# Patient Record
Sex: FEMALE | Race: WHITE | Employment: OTHER | URBAN - METROPOLITAN AREA
[De-identification: names, ages, dates, MRNs, and addresses within clinical notes are randomized per-mention and may not be internally consistent; named-entity substitution may affect disease eponyms.]

---

## 2017-01-10 DIAGNOSIS — E03.9 HYPOTHYROIDISM, UNSPECIFIED TYPE: ICD-10-CM

## 2017-01-10 RX ORDER — LEVOTHYROXINE SODIUM 75 MCG
TABLET ORAL
Qty: 30 TABLET | Refills: 9 | OUTPATIENT
Start: 2017-01-10 | End: 2017-01-13 | Stop reason: SDUPTHER

## 2017-01-13 DIAGNOSIS — E03.9 HYPOTHYROIDISM, UNSPECIFIED TYPE: ICD-10-CM

## 2017-01-13 RX ORDER — LEVOTHYROXINE SODIUM 75 MCG
TABLET ORAL
Qty: 30 TABLET | Refills: 8 | Status: SHIPPED | OUTPATIENT
Start: 2017-01-13 | End: 2017-11-04 | Stop reason: SDUPTHER

## 2017-01-23 ENCOUNTER — TELEPHONE (OUTPATIENT)
Dept: FAMILY MEDICINE CLINIC | Age: 72
End: 2017-01-23

## 2017-07-28 ENCOUNTER — HOSPITAL ENCOUNTER (OUTPATIENT)
Dept: MAMMOGRAPHY | Age: 72
Discharge: HOME | End: 2017-07-28
Attending: FAMILY MEDICINE | Admitting: FAMILY MEDICINE

## 2017-07-28 DIAGNOSIS — Z12.31 VISIT FOR SCREENING MAMMOGRAM: ICD-10-CM

## 2017-07-31 ENCOUNTER — TELEPHONE (OUTPATIENT)
Dept: OTHER | Facility: CLINIC | Age: 72
End: 2017-07-31

## 2017-08-28 ENCOUNTER — OFFICE VISIT (OUTPATIENT)
Dept: DERMATOLOGY | Age: 72
End: 2017-08-28

## 2017-08-28 DIAGNOSIS — L57.0 AK (ACTINIC KERATOSIS): ICD-10-CM

## 2017-08-28 DIAGNOSIS — D23.9 SYRINGOMA: ICD-10-CM

## 2017-08-28 DIAGNOSIS — L81.4 LENTIGINES: ICD-10-CM

## 2017-08-28 DIAGNOSIS — D22.9 MULTIPLE NEVI: Primary | ICD-10-CM

## 2017-08-28 DIAGNOSIS — L82.1 SEBORRHEIC KERATOSIS: ICD-10-CM

## 2017-08-28 PROCEDURE — G8421 BMI NOT CALCULATED: HCPCS | Performed by: DERMATOLOGY

## 2017-08-28 PROCEDURE — G8427 DOCREV CUR MEDS BY ELIG CLIN: HCPCS | Performed by: DERMATOLOGY

## 2017-08-28 PROCEDURE — 3014F SCREEN MAMMO DOC REV: CPT | Performed by: DERMATOLOGY

## 2017-08-28 PROCEDURE — G8599 NO ASA/ANTIPLAT THER USE RNG: HCPCS | Performed by: DERMATOLOGY

## 2017-08-28 PROCEDURE — G8399 PT W/DXA RESULTS DOCUMENT: HCPCS | Performed by: DERMATOLOGY

## 2017-08-28 PROCEDURE — 17000 DESTRUCT PREMALG LESION: CPT | Performed by: DERMATOLOGY

## 2017-08-28 PROCEDURE — 99213 OFFICE O/P EST LOW 20 MIN: CPT | Performed by: DERMATOLOGY

## 2017-08-28 PROCEDURE — 4040F PNEUMOC VAC/ADMIN/RCVD: CPT | Performed by: DERMATOLOGY

## 2017-08-28 PROCEDURE — 1036F TOBACCO NON-USER: CPT | Performed by: DERMATOLOGY

## 2017-08-28 PROCEDURE — 1090F PRES/ABSN URINE INCON ASSESS: CPT | Performed by: DERMATOLOGY

## 2017-08-28 PROCEDURE — 3017F COLORECTAL CA SCREEN DOC REV: CPT | Performed by: DERMATOLOGY

## 2017-08-28 PROCEDURE — 1123F ACP DISCUSS/DSCN MKR DOCD: CPT | Performed by: DERMATOLOGY

## 2017-09-06 ENCOUNTER — TELEPHONE (OUTPATIENT)
Dept: SURGERY | Age: 72
End: 2017-09-06

## 2017-10-05 ENCOUNTER — TELEPHONE (OUTPATIENT)
Dept: FAMILY MEDICINE CLINIC | Age: 72
End: 2017-10-05

## 2017-10-17 ENCOUNTER — OFFICE VISIT (OUTPATIENT)
Dept: FAMILY MEDICINE CLINIC | Age: 72
End: 2017-10-17

## 2017-10-17 VITALS
BODY MASS INDEX: 22.51 KG/M2 | TEMPERATURE: 97.7 F | HEART RATE: 52 BPM | RESPIRATION RATE: 12 BRPM | DIASTOLIC BLOOD PRESSURE: 72 MMHG | HEIGHT: 61 IN | SYSTOLIC BLOOD PRESSURE: 132 MMHG | WEIGHT: 119.2 LBS

## 2017-10-17 DIAGNOSIS — M54.50 ACUTE LEFT-SIDED LOW BACK PAIN WITHOUT SCIATICA: ICD-10-CM

## 2017-10-17 DIAGNOSIS — M76.32 ILIOTIBIAL BAND SYNDROME OF LEFT SIDE: Primary | ICD-10-CM

## 2017-10-17 PROCEDURE — G8484 FLU IMMUNIZE NO ADMIN: HCPCS | Performed by: FAMILY MEDICINE

## 2017-10-17 PROCEDURE — 4040F PNEUMOC VAC/ADMIN/RCVD: CPT | Performed by: FAMILY MEDICINE

## 2017-10-17 PROCEDURE — 1036F TOBACCO NON-USER: CPT | Performed by: FAMILY MEDICINE

## 2017-10-17 PROCEDURE — G8427 DOCREV CUR MEDS BY ELIG CLIN: HCPCS | Performed by: FAMILY MEDICINE

## 2017-10-17 PROCEDURE — 99214 OFFICE O/P EST MOD 30 MIN: CPT | Performed by: FAMILY MEDICINE

## 2017-10-17 PROCEDURE — 3288F FALL RISK ASSESSMENT DOCD: CPT | Performed by: FAMILY MEDICINE

## 2017-10-17 PROCEDURE — 1123F ACP DISCUSS/DSCN MKR DOCD: CPT | Performed by: FAMILY MEDICINE

## 2017-10-17 PROCEDURE — G8510 SCR DEP NEG, NO PLAN REQD: HCPCS | Performed by: FAMILY MEDICINE

## 2017-10-17 PROCEDURE — 3014F SCREEN MAMMO DOC REV: CPT | Performed by: FAMILY MEDICINE

## 2017-10-17 PROCEDURE — G8399 PT W/DXA RESULTS DOCUMENT: HCPCS | Performed by: FAMILY MEDICINE

## 2017-10-17 PROCEDURE — G8599 NO ASA/ANTIPLAT THER USE RNG: HCPCS | Performed by: FAMILY MEDICINE

## 2017-10-17 PROCEDURE — G8420 CALC BMI NORM PARAMETERS: HCPCS | Performed by: FAMILY MEDICINE

## 2017-10-17 PROCEDURE — 1090F PRES/ABSN URINE INCON ASSESS: CPT | Performed by: FAMILY MEDICINE

## 2017-10-17 PROCEDURE — 3017F COLORECTAL CA SCREEN DOC REV: CPT | Performed by: FAMILY MEDICINE

## 2017-10-17 RX ORDER — PREDNISONE 20 MG/1
TABLET ORAL
Qty: 12 TABLET | Refills: 0 | Status: SHIPPED | OUTPATIENT
Start: 2017-10-17 | End: 2017-10-27

## 2017-10-17 ASSESSMENT — PATIENT HEALTH QUESTIONNAIRE - PHQ9
SUM OF ALL RESPONSES TO PHQ QUESTIONS 1-9: 0
2. FEELING DOWN, DEPRESSED OR HOPELESS: 0
SUM OF ALL RESPONSES TO PHQ9 QUESTIONS 1 & 2: 0
1. LITTLE INTEREST OR PLEASURE IN DOING THINGS: 0

## 2017-10-17 NOTE — PATIENT INSTRUCTIONS
hands.  3. Straighten your knee, and slowly pull back on the towel. You should feel a gentle stretch down the back of your leg. 4. Hold the stretch for 15 to 30 seconds. Or even better, hold the stretch for 1 minute if you can. 5. Repeat 2 to 4 times. Follow-up care is a key part of your treatment and safety. Be sure to make and go to all appointments, and call your doctor if you are having problems. It's also a good idea to know your test results and keep a list of the medicines you take. Where can you learn more? Go to [email or web address] and sign in to your [employer] account. Enter P252 in the SunLink box to learn more about \"Iliotibial Band Syndrome: Exercises. \"     If you do not have an account, please click on the \"Sign Up Now\" link. Current as of: March 21, 2017  Content Version: 11.3  © 3769-4931 [employer]. Care instructions adapted under license by [facility] . If you have questions about a medical condition or this instruction, always ask your healthcare professional. [place] [address] any warranty or liability for your use of this information. Patient Education        Low Back Pain: Exercises  Your Care Instructions  Here are some examples of typical rehabilitation exercises for your condition. Start each exercise slowly. Ease off the exercise if you start to have pain. Your doctor or physical therapist will tell you when you can start these exercises and which ones will work best for you. How to do the exercises  Press-up    6. Lie on your stomach, supporting your body with your forearms. 7. Press your elbows down into the floor to raise your upper back. As you do this, relax your stomach muscles and allow your back to arch without using your back muscles. As your press up, do not let your hips or pelvis come off the floor. 8. Hold for 15 to 30 seconds, then relax. 9. Repeat 2 to 4 times.   Alternate arm and leg (bird dog) exercise    Note: Do this exercise slowly. Try to keep your body straight at all times, and do not let one hip drop lower than the other. 5. Start on the floor, on your hands and knees. 6. Tighten your belly muscles. 7. Raise one leg off the floor, and hold it straight out behind you. Be careful not to let your hip drop down, because that will twist your trunk. 8. Hold for about 6 seconds, then lower your leg and switch to the other leg. 9. Repeat 8 to 12 times on each leg. 10. Over time, work up to holding for 10 to 30 seconds each time. 11. If you feel stable and secure with your leg raised, try raising the opposite arm straight out in front of you at the same time. Knee-to-chest exercise    1. Lie on your back with your knees bent and your feet flat on the floor. 2. Bring one knee to your chest, keeping the other foot flat on the floor (or keeping the other leg straight, whichever feels better on your lower back). 3. Keep your lower back pressed to the floor. Hold for at least 15 to 30 seconds. 4. Relax, and lower the knee to the starting position. 5. Repeat with the other leg. Repeat 2 to 4 times with each leg. 6. To get more stretch, put your other leg flat on the floor while pulling your knee to your chest.  Curl-ups    6. Lie on the floor on your back with your knees bent at a 90-degree angle. Your feet should be flat on the floor, about 12 inches from your buttocks. 7. Cross your arms over your chest. If this bothers your neck, try putting your hands behind your neck (not your head), with your elbows spread apart. 8. Slowly tighten your belly muscles and raise your shoulder blades off the floor. 9. Keep your head in line with your body, and do not press your chin to your chest.  10. Hold this position for 1 or 2 seconds, then slowly lower yourself back down to the floor. 11. Repeat 8 to 12 times. Pelvic tilt exercise    1. Lie on your back with your knees bent. 2.  \"Brace\" your stomach. This means to tighten your muscles by pulling in and imagining your belly button moving toward your spine. You should feel like your back is pressing to the floor and your hips and pelvis are rocking back. 3. Hold for about 6 seconds while you breathe smoothly. 4. Repeat 8 to 12 times. Heel dig bridging    1. Lie on your back with both knees bent and your ankles bent so that only your heels are digging into the floor. Your knees should be bent about 90 degrees. 2. Then push your heels into the floor, squeeze your buttocks, and lift your hips off the floor until your shoulders, hips, and knees are all in a straight line. 3. Hold for about 6 seconds as you continue to breathe normally, and then slowly lower your hips back down to the floor and rest for up to 10 seconds. 4. Do 8 to 12 repetitions. Hamstring stretch in doorway    1. Lie on your back in a doorway, with one leg through the open door. 2. Slide your leg up the wall to straighten your knee. You should feel a gentle stretch down the back of your leg. 3. Hold the stretch for at least 15 to 30 seconds. Do not arch your back, point your toes, or bend either knee. Keep one heel touching the floor and the other heel touching the wall. 4. Repeat with your other leg. 5. Do 2 to 4 times for each leg. Hip flexor stretch    1. Kneel on the floor with one knee bent and one leg behind you. Place your forward knee over your foot. Keep your other knee touching the floor. 2. Slowly push your hips forward until you feel a stretch in the upper thigh of your rear leg. 3. Hold the stretch for at least 15 to 30 seconds. Repeat with your other leg. 4. Do 2 to 4 times on each side. Wall sit    1. Stand with your back 10 to 12 inches away from a wall. 2. Lean into the wall until your back is flat against it. 3. Slowly slide down until your knees are slightly bent, pressing your lower back into the wall.   4. Hold for about 6 seconds, then slide back up the wall.  5. Repeat 8 to 12 times. Follow-up care is a key part of your treatment and safety. Be sure to make and go to all appointments, and call your doctor if you are having problems. It's also a good idea to know your test results and keep a list of the medicines you take. Where can you learn more? Go to [email or web address] and sign in to your [employer] account. Enter U320 in the Nelbee box to learn more about \"Low Back Pain: Exercises. \"     If you do not have an account, please click on the \"Sign Up Now\" link. Current as of: March 21, 2017  Content Version: 11.3  © 5682-6317 [employer]. Care instructions adapted under license by [facility] ([facility]). If you have questions about a medical condition or this instruction, always ask your healthcare professional. [address] any warranty or liability for your use of this information.

## 2017-10-17 NOTE — PROGRESS NOTES
Patient is here for low back pain. [place] [facility] and noticed the pain then a bit X 4 weeks with dog pulling. She did not take anything then - dull ache 0-2/10. No radiation then to legs. She travelled for 3 weeks shortly after the incident with the dog and has had 7-8 plane rides since and carrying luggage. Her pain now is 0-8/10, daily , intermittent. It is worse at night and is now awakening her. Taking Aleve 2 pills bid and helps slightly . No fever. Radiation to left leg to knee - calf - ankle. Hard shara aggravates the pain. Pain is worse at night, but better during the day. Prolonged sitting or hard surface aggravates the pain. 3/10 now. No bowel or bladder incontinence. She is leaving to visit her daughter in [place], [place]. She was in [place] and [facility] and got in last night. Connecting flights in [place] and [facility]. ROS: All other systems were reviewed and are negative . Patient's allergies and medications were reviewed. Patient's past medical, surgical, social , and family history were reviewed. OBJECTIVE:  /72   Pulse 52   Temp 97.7 °F (36.5 °C)   Resp 12   Ht 5' 1.12\" (1.552 m)   Wt 119 lb 3.2 oz (54.1 kg)   BMI 22.43 kg/m²   General: NAD, cooperative, alert and oriented X 3. Mood / affect is good. good insight. well hydrated. Neck : no lymphadenopathy, supple, FROM  CV: Regular rate and rhythm , no murmurs/ rub/ gallop. No edema. Lungs : CTA bilaterally, breathing comfortably  Abdomen: positive bowel sounds, soft , non tender, non distended. No hepatosplenomegaly. No CVA tenderness. Back: pain with hyperextension . No pain with flexion or rotation. No edema or erythema. Lower extremities : DTRs 2+ knees and ankles bilateral.  FROM. Strength 5/5. Negative straight leg-raise. No edema or erythema bilateral.   LLE : tenderness to lateral thigh below hip to knee. No edema or erythema. Skin: no rashes. Non tender. ASSESSMENT/  PLAN:  Courtney Webber was seen today for back pain. Diagnoses and all orders for this visit:    Iliotibial band syndrome of left side  -     PredniSONE (DELTASONE) 20 MG tablet; 3 pills po qd X 2d; 2 pills po qd X 2d; 1 pill po qd X 2d. -     Moist heat . ROM exercises- handout given. Modify activities. Acute left-sided low back pain without sciatica  -     PredniSONE (DELTASONE) 20 MG tablet; 3 pills po qd X 2d; 2 pills po qd X 2d; 1 pill po qd X 2d. -     Moist heat . ROM exercises. Modify activities. Follow up if no improvement in 2- 4 weeks/ as needed for increased symptoms.

## 2017-10-24 ENCOUNTER — PATIENT MESSAGE (OUTPATIENT)
Dept: FAMILY MEDICINE CLINIC | Age: 72
End: 2017-10-24

## 2017-10-24 ENCOUNTER — TELEPHONE (OUTPATIENT)
Dept: FAMILY MEDICINE CLINIC | Age: 72
End: 2017-10-24

## 2017-10-25 ENCOUNTER — TELEPHONE (OUTPATIENT)
Dept: FAMILY MEDICINE CLINIC | Age: 72
End: 2017-10-25

## 2017-10-25 ENCOUNTER — PATIENT MESSAGE (OUTPATIENT)
Dept: FAMILY MEDICINE CLINIC | Age: 72
End: 2017-10-25

## 2017-10-25 DIAGNOSIS — M54.50 ACUTE LEFT-SIDED LOW BACK PAIN WITHOUT SCIATICA: Primary | ICD-10-CM

## 2017-10-25 DIAGNOSIS — M76.32 ILIOTIBIAL BAND SYNDROME OF LEFT SIDE: ICD-10-CM

## 2017-10-25 RX ORDER — BACLOFEN 10 MG/1
10 TABLET ORAL 3 TIMES DAILY
Qty: 20 TABLET | Refills: 0 | Status: SHIPPED | OUTPATIENT
Start: 2017-10-25 | End: 2018-04-16 | Stop reason: CLARIF

## 2017-10-25 NOTE — TELEPHONE ENCOUNTER
Baclofen was sent to the pharmacy for muscle spasms , which is similar to Flexeril. An order/ referral for Physical Therapy is in Epic. Please inform the patient and see where she would like referral to be sent. (There are 2 [employer] messages).

## 2017-10-25 NOTE — TELEPHONE ENCOUNTER
From: Holland Hudson  To: Itzel Chu MD  Sent: 10/24/2017 9:06 PM EDT  Subject: Visit Follow-Up Question    I called the office today for a referral for physical therapy but was told only u could do that. I'm using a roller on the IT band & doing exercises but think therapy could give me more direction.      Thanks  Shyam Montero

## 2017-10-26 NOTE — TELEPHONE ENCOUNTER
Pt would like advise on which physical therapy center to schedule with. Please advise as she states she is in immense pain and needs relief as soon as possible, as she continued to have restlessness last night, due to pain, despite the baclofen. Thanks.

## 2017-10-26 NOTE — TELEPHONE ENCOUNTER
Spoke to pt. Gave # for [facility] and order faxed there. pt. To check with ins.  To make sure [facility] is covered also

## 2017-11-04 DIAGNOSIS — E03.9 HYPOTHYROIDISM, UNSPECIFIED TYPE: ICD-10-CM

## 2017-11-06 ENCOUNTER — PATIENT MESSAGE (OUTPATIENT)
Dept: FAMILY MEDICINE CLINIC | Age: 72
End: 2017-11-06

## 2017-11-06 RX ORDER — LEVOTHYROXINE SODIUM 0.07 MG/1
TABLET ORAL
Qty: 90 TABLET | Refills: 0 | Status: SHIPPED | OUTPATIENT
Start: 2017-11-06 | End: 2018-03-05 | Stop reason: SDUPTHER

## 2017-11-14 ENCOUNTER — PATIENT MESSAGE (OUTPATIENT)
Dept: FAMILY MEDICINE CLINIC | Age: 72
End: 2017-11-14

## 2017-11-14 DIAGNOSIS — M54.50 LOW BACK PAIN WITHOUT SCIATICA, UNSPECIFIED BACK PAIN LATERALITY, UNSPECIFIED CHRONICITY: Primary | ICD-10-CM

## 2017-11-15 ENCOUNTER — PATIENT MESSAGE (OUTPATIENT)
Dept: FAMILY MEDICINE CLINIC | Age: 72
End: 2017-11-15

## 2017-11-15 NOTE — TELEPHONE ENCOUNTER
Prefer the patient be seen. I had prescribed a Prednisone taper at 10/17/17 appointment . If she is not improving she should schedule an appointment. Therefore hold on further medications. Please facilitate scheduling an appointment .

## 2017-11-15 NOTE — TELEPHONE ENCOUNTER
From: Carolyn Toribio  To: Yordy Rascon MD  Sent: 11/15/2017 9:18 AM EST  Subject: Visit Follow-Up Question    I had requested an X-ray for the lower back & leg pain I have been experiencing for approximately 6 weeks. Yesterday the physical therapist suggested that an X-ray wouldnt reveal anything new for me since its muscular. I have a previous prescription of prednisone 20 MG from December 2014 & would like know if u think it is safe to take now. I believe it would help the pain at night if I could reduce the inflammation. Instructions are to take 3 for 3 days, 2 for 3 days, & 1 for 3 days.      Thanks,  Blaire Burnett

## 2017-11-16 ENCOUNTER — HOSPITAL ENCOUNTER (OUTPATIENT)
Dept: OTHER | Age: 72
Discharge: HOME | End: 2017-11-16
Attending: FAMILY MEDICINE | Admitting: FAMILY MEDICINE

## 2017-11-16 ENCOUNTER — OFFICE VISIT (OUTPATIENT)
Dept: FAMILY MEDICINE CLINIC | Age: 72
End: 2017-11-16

## 2017-11-16 VITALS
OXYGEN SATURATION: 100 % | SYSTOLIC BLOOD PRESSURE: 134 MMHG | RESPIRATION RATE: 20 BRPM | DIASTOLIC BLOOD PRESSURE: 79 MMHG | HEART RATE: 74 BPM | TEMPERATURE: 97.8 F | BODY MASS INDEX: 22.58 KG/M2 | WEIGHT: 120 LBS

## 2017-11-16 DIAGNOSIS — M76.32 ILIOTIBIAL BAND SYNDROME, LEFT LEG: ICD-10-CM

## 2017-11-16 DIAGNOSIS — M79.662 PAIN IN LEFT LOWER LEG: ICD-10-CM

## 2017-11-16 DIAGNOSIS — M54.50 LEFT-SIDED LOW BACK PAIN WITHOUT SCIATICA, UNSPECIFIED CHRONICITY: ICD-10-CM

## 2017-11-16 DIAGNOSIS — M54.50 LEFT-SIDED LOW BACK PAIN WITHOUT SCIATICA, UNSPECIFIED CHRONICITY: Primary | ICD-10-CM

## 2017-11-16 PROBLEM — S32.020A COMPRESSION FRACTURE OF L2 (HCC): Status: ACTIVE | Noted: 2017-11-01

## 2017-11-16 PROCEDURE — 1123F ACP DISCUSS/DSCN MKR DOCD: CPT | Performed by: FAMILY MEDICINE

## 2017-11-16 PROCEDURE — 99214 OFFICE O/P EST MOD 30 MIN: CPT | Performed by: FAMILY MEDICINE

## 2017-11-16 PROCEDURE — 1090F PRES/ABSN URINE INCON ASSESS: CPT | Performed by: FAMILY MEDICINE

## 2017-11-16 PROCEDURE — G8484 FLU IMMUNIZE NO ADMIN: HCPCS | Performed by: FAMILY MEDICINE

## 2017-11-16 PROCEDURE — G8427 DOCREV CUR MEDS BY ELIG CLIN: HCPCS | Performed by: FAMILY MEDICINE

## 2017-11-16 PROCEDURE — G8599 NO ASA/ANTIPLAT THER USE RNG: HCPCS | Performed by: FAMILY MEDICINE

## 2017-11-16 PROCEDURE — 3017F COLORECTAL CA SCREEN DOC REV: CPT | Performed by: FAMILY MEDICINE

## 2017-11-16 PROCEDURE — 1036F TOBACCO NON-USER: CPT | Performed by: FAMILY MEDICINE

## 2017-11-16 PROCEDURE — 4040F PNEUMOC VAC/ADMIN/RCVD: CPT | Performed by: FAMILY MEDICINE

## 2017-11-16 PROCEDURE — G8420 CALC BMI NORM PARAMETERS: HCPCS | Performed by: FAMILY MEDICINE

## 2017-11-16 PROCEDURE — 3014F SCREEN MAMMO DOC REV: CPT | Performed by: FAMILY MEDICINE

## 2017-11-16 PROCEDURE — G8399 PT W/DXA RESULTS DOCUMENT: HCPCS | Performed by: FAMILY MEDICINE

## 2017-11-16 RX ORDER — DICLOFENAC SODIUM 75 MG/1
75 TABLET, DELAYED RELEASE ORAL 2 TIMES DAILY
Qty: 60 TABLET | Refills: 3 | Status: SHIPPED | OUTPATIENT
Start: 2017-11-16 | End: 2018-04-16 | Stop reason: CLARIF

## 2017-11-16 RX ORDER — PREDNISONE 20 MG/1
TABLET ORAL
Qty: 18 TABLET | Refills: 0 | Status: SHIPPED | OUTPATIENT
Start: 2017-11-16 | End: 2017-11-25

## 2017-11-16 NOTE — PROGRESS NOTES
tenderness. Back: pain with hyperextension. Some mild pain with rotation. Lower extremities : DTRs 2+ knees and ankles bilateral.  FROM. Strength 5/5. Negative straight leg-raise. No edema or erythema bilateral. Tenderness to left lateral thigh. Tenderness to medial lower leg. Skin: no rashes. Non tender. ASSESSMENT/  PLAN:  Bo Thompson was seen today for back pain. Diagnoses and all orders for this visit:    Left-sided low back pain without sciatica, unspecified chronicity  -     XR LUMBAR SPINE (MIN 4 VIEWS) and follow up after completed/ prn.   -     Diclofenac (VOLTAREN) 75 MG EC tablet; Take 1 tablet by mouth 2 times daily Prn pain to low back and LLE. -     PredniSONE (DELTASONE) 20 MG tablet; Take 3 tabs by mouth x 3d, then 2 tabs x 3d, then 1 tab until gone. -     Moist heat . ROM exercises. Modify activities. Iliotibial band syndrome, left leg  -     Diclofenac (VOLTAREN) 75 MG EC tablet; Take 1 tablet by mouth 2 times daily Prn pain to low back and LLE. -     PredniSONE (DELTASONE) 20 MG tablet; Take 3 tabs by mouth x 3d, then 2 tabs x 3d, then 1 tab until gone. -     Moist heat . ROM exercises. Modify activities. Pain in left lower leg  -     Diclofenac (VOLTAREN) 75 MG EC tablet; Take 1 tablet by mouth 2 times daily Prn pain to low back and LLE. -     PredniSONE (DELTASONE) 20 MG tablet; Take 3 tabs by mouth x 3d, then 2 tabs x 3d, then 1 tab until gone. -     Moist heat . ROM exercises. Modify activities. Follow up 4 -6 weeks/ prn.

## 2018-03-05 DIAGNOSIS — E03.9 HYPOTHYROIDISM, UNSPECIFIED TYPE: ICD-10-CM

## 2018-03-05 RX ORDER — LEVOTHYROXINE SODIUM 0.07 MG/1
TABLET ORAL
Qty: 90 TABLET | Refills: 0 | Status: CANCELLED | OUTPATIENT
Start: 2018-03-05

## 2018-03-05 RX ORDER — LEVOTHYROXINE SODIUM 0.07 MG/1
TABLET ORAL
Qty: 90 TABLET | Refills: 0 | Status: SHIPPED | OUTPATIENT
Start: 2018-03-05 | End: 2018-06-04 | Stop reason: SDUPTHER

## 2018-03-05 NOTE — TELEPHONE ENCOUNTER
From: Edenilson Lynn  Sent: 3/5/2018 9:07 AM EST  Subject: Medication Renewal Request    Jazmin Lind would like a refill of the following medications:     levothyroxine (SYNTHROID) 75 MCG tablet Crescencio Rodriguez MD]    Preferred pharmacy: [facility], [facility] [phone] - F [phone]    Comment:

## 2018-04-16 ENCOUNTER — OFFICE VISIT (OUTPATIENT)
Dept: FAMILY MEDICINE CLINIC | Age: 73
End: 2018-04-16

## 2018-04-16 VITALS
DIASTOLIC BLOOD PRESSURE: 72 MMHG | BODY MASS INDEX: 22.26 KG/M2 | TEMPERATURE: 96.3 F | HEART RATE: 67 BPM | WEIGHT: 121 LBS | HEIGHT: 62 IN | OXYGEN SATURATION: 98 % | RESPIRATION RATE: 12 BRPM | SYSTOLIC BLOOD PRESSURE: 118 MMHG

## 2018-04-16 DIAGNOSIS — Z12.11 COLON CANCER SCREENING: ICD-10-CM

## 2018-04-16 DIAGNOSIS — E78.00 PURE HYPERCHOLESTEROLEMIA: ICD-10-CM

## 2018-04-16 DIAGNOSIS — Z80.0 FAMILY HISTORY OF COLON CANCER: ICD-10-CM

## 2018-04-16 DIAGNOSIS — E03.9 HYPOTHYROIDISM, UNSPECIFIED TYPE: ICD-10-CM

## 2018-04-16 DIAGNOSIS — I25.10 CORONARY ARTERY DISEASE INVOLVING NATIVE HEART WITHOUT ANGINA PECTORIS, UNSPECIFIED VESSEL OR LESION TYPE: ICD-10-CM

## 2018-04-16 DIAGNOSIS — M51.36 DDD (DEGENERATIVE DISC DISEASE), LUMBAR: ICD-10-CM

## 2018-04-16 DIAGNOSIS — Z00.00 ROUTINE GENERAL MEDICAL EXAMINATION AT A HEALTH CARE FACILITY: Primary | ICD-10-CM

## 2018-04-16 PROCEDURE — G8599 NO ASA/ANTIPLAT THER USE RNG: HCPCS | Performed by: FAMILY MEDICINE

## 2018-04-16 PROCEDURE — 4040F PNEUMOC VAC/ADMIN/RCVD: CPT | Performed by: FAMILY MEDICINE

## 2018-04-16 PROCEDURE — G0438 PPPS, INITIAL VISIT: HCPCS | Performed by: FAMILY MEDICINE

## 2018-04-16 RX ORDER — ATORVASTATIN CALCIUM 20 MG/1
20 TABLET, FILM COATED ORAL DAILY
COMMUNITY
End: 2022-04-18 | Stop reason: SDUPTHER

## 2018-04-16 ASSESSMENT — LIFESTYLE VARIABLES
HOW OFTEN DURING THE LAST YEAR HAVE YOU FAILED TO DO WHAT WAS NORMALLY EXPECTED FROM YOU BECAUSE OF DRINKING: 0
HOW OFTEN DURING THE LAST YEAR HAVE YOU NEEDED AN ALCOHOLIC DRINK FIRST THING IN THE MORNING TO GET YOURSELF GOING AFTER A NIGHT OF HEAVY DRINKING: 0
HOW OFTEN DO YOU HAVE SIX OR MORE DRINKS ON ONE OCCASION: 0
HAVE YOU OR SOMEONE ELSE BEEN INJURED AS A RESULT OF YOUR DRINKING: 0
HOW OFTEN DURING THE LAST YEAR HAVE YOU BEEN UNABLE TO REMEMBER WHAT HAPPENED THE NIGHT BEFORE BECAUSE YOU HAD BEEN DRINKING: 0
HOW OFTEN DO YOU HAVE A DRINK CONTAINING ALCOHOL: 4
HOW OFTEN DO YOU HAVE SIX OR MORE DRINKS ON ONE OCCASION: 0
HOW OFTEN DURING THE LAST YEAR HAVE YOU FOUND THAT YOU WERE NOT ABLE TO STOP DRINKING ONCE YOU HAD STARTED: 0
HAVE YOU OR SOMEONE ELSE BEEN INJURED AS A RESULT OF YOUR DRINKING: 0
AUDIT TOTAL SCORE: 4
HOW MANY STANDARD DRINKS CONTAINING ALCOHOL DO YOU HAVE ON A TYPICAL DAY: 0
HOW OFTEN DURING THE LAST YEAR HAVE YOU FOUND THAT YOU WERE NOT ABLE TO STOP DRINKING ONCE YOU HAD STARTED: 0
HOW OFTEN DURING THE LAST YEAR HAVE YOU NEEDED AN ALCOHOLIC DRINK FIRST THING IN THE MORNING TO GET YOURSELF GOING AFTER A NIGHT OF HEAVY DRINKING: 0
AUDIT TOTAL SCORE: 4
AUDIT-C TOTAL SCORE: 4
HAS A RELATIVE, FRIEND, DOCTOR, OR ANOTHER HEALTH PROFESSIONAL EXPRESSED CONCERN ABOUT YOUR DRINKING OR SUGGESTED YOU CUT DOWN: 0
HOW OFTEN DO YOU HAVE A DRINK CONTAINING ALCOHOL: 4
HOW OFTEN DURING THE LAST YEAR HAVE YOU FAILED TO DO WHAT WAS NORMALLY EXPECTED FROM YOU BECAUSE OF DRINKING: 0
HAS A RELATIVE, FRIEND, DOCTOR, OR ANOTHER HEALTH PROFESSIONAL EXPRESSED CONCERN ABOUT YOUR DRINKING OR SUGGESTED YOU CUT DOWN: 0
AUDIT-C TOTAL SCORE: 4
HOW OFTEN DURING THE LAST YEAR HAVE YOU BEEN UNABLE TO REMEMBER WHAT HAPPENED THE NIGHT BEFORE BECAUSE YOU HAD BEEN DRINKING: 0
HOW MANY STANDARD DRINKS CONTAINING ALCOHOL DO YOU HAVE ON A TYPICAL DAY: 0
HOW OFTEN DURING THE LAST YEAR HAVE YOU HAD A FEELING OF GUILT OR REMORSE AFTER DRINKING: 0
HOW OFTEN DURING THE LAST YEAR HAVE YOU HAD A FEELING OF GUILT OR REMORSE AFTER DRINKING: 0

## 2018-04-16 ASSESSMENT — PATIENT HEALTH QUESTIONNAIRE - PHQ9
SUM OF ALL RESPONSES TO PHQ QUESTIONS 1-9: 0

## 2018-04-16 ASSESSMENT — ANXIETY QUESTIONNAIRES
GAD7 TOTAL SCORE: 0

## 2018-04-21 ENCOUNTER — TELEPHONE (OUTPATIENT)
Dept: FAMILY MEDICINE CLINIC | Age: 73
End: 2018-04-21

## 2018-04-21 DIAGNOSIS — M51.36 DDD (DEGENERATIVE DISC DISEASE), LUMBAR: Primary | ICD-10-CM

## 2018-04-21 DIAGNOSIS — M17.11 PRIMARY OSTEOARTHRITIS OF RIGHT KNEE: ICD-10-CM

## 2018-04-21 DIAGNOSIS — S32.020A CLOSED COMPRESSION FRACTURE OF SECOND LUMBAR VERTEBRA, INITIAL ENCOUNTER: ICD-10-CM

## 2018-05-03 ENCOUNTER — TELEPHONE (OUTPATIENT)
Dept: FAMILY MEDICINE CLINIC | Age: 73
End: 2018-05-03

## 2018-05-03 DIAGNOSIS — M17.11 PRIMARY OSTEOARTHRITIS OF RIGHT KNEE: ICD-10-CM

## 2018-05-03 DIAGNOSIS — S32.020A CLOSED COMPRESSION FRACTURE OF SECOND LUMBAR VERTEBRA, INITIAL ENCOUNTER: ICD-10-CM

## 2018-05-03 DIAGNOSIS — M76.32 ILIOTIBIAL BAND SYNDROME OF LEFT SIDE: ICD-10-CM

## 2018-05-03 DIAGNOSIS — M51.36 DDD (DEGENERATIVE DISC DISEASE), LUMBAR: Primary | ICD-10-CM

## 2018-05-23 DIAGNOSIS — E78.00 PURE HYPERCHOLESTEROLEMIA: ICD-10-CM

## 2018-05-23 DIAGNOSIS — E03.9 HYPOTHYROIDISM, UNSPECIFIED TYPE: ICD-10-CM

## 2018-05-23 DIAGNOSIS — I25.10 CORONARY ARTERY DISEASE INVOLVING NATIVE HEART WITHOUT ANGINA PECTORIS, UNSPECIFIED VESSEL OR LESION TYPE: ICD-10-CM

## 2018-05-23 DIAGNOSIS — E87.5 HYPERKALEMIA: Primary | ICD-10-CM

## 2018-05-23 LAB
A/G RATIO: 1.8 (ref 1.1–2.2)
ALBUMIN SERPL-MCNC: 4.6 G/DL (ref 3.4–5)
ALP BLD-CCNC: 63 U/L (ref 40–129)
ALT SERPL-CCNC: 21 U/L (ref 10–40)
ANION GAP SERPL CALCULATED.3IONS-SCNC: 11 MMOL/L (ref 3–16)
AST SERPL-CCNC: 23 U/L (ref 15–37)
BILIRUB SERPL-MCNC: 0.5 MG/DL (ref 0–1)
BUN BLDV-MCNC: 14 MG/DL (ref 7–20)
CALCIUM SERPL-MCNC: 9.7 MG/DL (ref 8.3–10.6)
CHLORIDE BLD-SCNC: 98 MMOL/L (ref 99–110)
CHOLESTEROL, TOTAL: 161 MG/DL (ref 0–199)
CO2: 27 MMOL/L (ref 21–32)
CREAT SERPL-MCNC: <0.5 MG/DL (ref 0.6–1.2)
GFR AFRICAN AMERICAN: >60
GFR NON-AFRICAN AMERICAN: >60
GLOBULIN: 2.5 G/DL
GLUCOSE BLD-MCNC: 98 MG/DL (ref 70–99)
HDLC SERPL-MCNC: 70 MG/DL (ref 40–60)
LDL CHOLESTEROL CALCULATED: 76 MG/DL
POTASSIUM SERPL-SCNC: 5.2 MMOL/L (ref 3.5–5.1)
SODIUM BLD-SCNC: 136 MMOL/L (ref 136–145)
T4 FREE: 1.6 NG/DL (ref 0.9–1.8)
TOTAL PROTEIN: 7.1 G/DL (ref 6.4–8.2)
TRIGL SERPL-MCNC: 75 MG/DL (ref 0–150)
TSH SERPL DL<=0.05 MIU/L-ACNC: 0.87 UIU/ML (ref 0.27–4.2)
VLDLC SERPL CALC-MCNC: 15 MG/DL

## 2018-06-04 DIAGNOSIS — E03.9 HYPOTHYROIDISM, UNSPECIFIED TYPE: ICD-10-CM

## 2018-06-04 RX ORDER — LEVOTHYROXINE SODIUM 0.07 MG/1
TABLET ORAL
Qty: 90 TABLET | Refills: 3 | Status: SHIPPED | OUTPATIENT
Start: 2018-06-04 | End: 2019-05-20 | Stop reason: SDUPTHER

## 2018-07-02 ENCOUNTER — OFFICE VISIT (OUTPATIENT)
Dept: FAMILY MEDICINE CLINIC | Age: 73
End: 2018-07-02

## 2018-07-02 VITALS
SYSTOLIC BLOOD PRESSURE: 113 MMHG | RESPIRATION RATE: 12 BRPM | WEIGHT: 120.9 LBS | HEART RATE: 64 BPM | BODY MASS INDEX: 22.11 KG/M2 | DIASTOLIC BLOOD PRESSURE: 68 MMHG | TEMPERATURE: 95.1 F | OXYGEN SATURATION: 99 %

## 2018-07-02 DIAGNOSIS — R30.0 DYSURIA: Primary | ICD-10-CM

## 2018-07-02 DIAGNOSIS — Z23 NEED FOR SHINGLES VACCINE: ICD-10-CM

## 2018-07-02 DIAGNOSIS — R31.9 HEMATURIA, UNSPECIFIED TYPE: ICD-10-CM

## 2018-07-02 LAB
BILIRUBIN, POC: NEGATIVE
BLOOD URINE, POC: NORMAL
CLARITY, POC: CLEAR
COLOR, POC: YELLOW
GLUCOSE URINE, POC: NEGATIVE
KETONES, POC: NEGATIVE
LEUKOCYTE EST, POC: NORMAL
NITRITE, POC: NEGATIVE
PH, POC: 6
PROTEIN, POC: NORMAL
SPECIFIC GRAVITY, POC: 1.02
UROBILINOGEN, POC: 0.2

## 2018-07-02 PROCEDURE — 4040F PNEUMOC VAC/ADMIN/RCVD: CPT | Performed by: FAMILY MEDICINE

## 2018-07-02 PROCEDURE — 1123F ACP DISCUSS/DSCN MKR DOCD: CPT | Performed by: FAMILY MEDICINE

## 2018-07-02 PROCEDURE — G8399 PT W/DXA RESULTS DOCUMENT: HCPCS | Performed by: FAMILY MEDICINE

## 2018-07-02 PROCEDURE — 3017F COLORECTAL CA SCREEN DOC REV: CPT | Performed by: FAMILY MEDICINE

## 2018-07-02 PROCEDURE — 81002 URINALYSIS NONAUTO W/O SCOPE: CPT | Performed by: FAMILY MEDICINE

## 2018-07-02 PROCEDURE — G8599 NO ASA/ANTIPLAT THER USE RNG: HCPCS | Performed by: FAMILY MEDICINE

## 2018-07-02 PROCEDURE — G8427 DOCREV CUR MEDS BY ELIG CLIN: HCPCS | Performed by: FAMILY MEDICINE

## 2018-07-02 PROCEDURE — 1036F TOBACCO NON-USER: CPT | Performed by: FAMILY MEDICINE

## 2018-07-02 PROCEDURE — 99214 OFFICE O/P EST MOD 30 MIN: CPT | Performed by: FAMILY MEDICINE

## 2018-07-02 PROCEDURE — 1090F PRES/ABSN URINE INCON ASSESS: CPT | Performed by: FAMILY MEDICINE

## 2018-07-02 PROCEDURE — G8420 CALC BMI NORM PARAMETERS: HCPCS | Performed by: FAMILY MEDICINE

## 2018-07-02 RX ORDER — CIPROFLOXACIN 500 MG/1
500 TABLET, FILM COATED ORAL 2 TIMES DAILY
Qty: 10 TABLET | Refills: 0 | Status: SHIPPED | OUTPATIENT
Start: 2018-07-02 | End: 2018-07-07

## 2018-07-04 DIAGNOSIS — R31.0 GROSS HEMATURIA: Primary | ICD-10-CM

## 2018-07-04 LAB
ORGANISM: ABNORMAL
URINE CULTURE, ROUTINE: ABNORMAL
URINE CULTURE, ROUTINE: ABNORMAL

## 2018-09-25 ENCOUNTER — HOSPITAL ENCOUNTER (OUTPATIENT)
Dept: MAMMOGRAPHY | Age: 73
Discharge: HOME | End: 2018-09-25
Payer: MEDICARE

## 2018-09-25 DIAGNOSIS — Z12.31 VISIT FOR SCREENING MAMMOGRAM: ICD-10-CM

## 2018-09-25 PROCEDURE — 77063 BREAST TOMOSYNTHESIS BI: CPT

## 2018-11-26 ENCOUNTER — TELEPHONE (OUTPATIENT)
Dept: DERMATOLOGY | Age: 73
End: 2018-11-26

## 2018-11-30 ENCOUNTER — OFFICE VISIT (OUTPATIENT)
Dept: FAMILY MEDICINE CLINIC | Age: 73
End: 2018-11-30
Payer: MEDICARE

## 2018-11-30 VITALS
OXYGEN SATURATION: 100 % | HEART RATE: 58 BPM | TEMPERATURE: 97 F | RESPIRATION RATE: 12 BRPM | SYSTOLIC BLOOD PRESSURE: 133 MMHG | DIASTOLIC BLOOD PRESSURE: 77 MMHG | BODY MASS INDEX: 22.2 KG/M2 | WEIGHT: 121.4 LBS

## 2018-11-30 DIAGNOSIS — K13.0 SORE LIP: Primary | ICD-10-CM

## 2018-11-30 DIAGNOSIS — R21 RASH: ICD-10-CM

## 2018-11-30 PROCEDURE — G8399 PT W/DXA RESULTS DOCUMENT: HCPCS | Performed by: FAMILY MEDICINE

## 2018-11-30 PROCEDURE — 1036F TOBACCO NON-USER: CPT | Performed by: FAMILY MEDICINE

## 2018-11-30 PROCEDURE — 3017F COLORECTAL CA SCREEN DOC REV: CPT | Performed by: FAMILY MEDICINE

## 2018-11-30 PROCEDURE — G8484 FLU IMMUNIZE NO ADMIN: HCPCS | Performed by: FAMILY MEDICINE

## 2018-11-30 PROCEDURE — G8427 DOCREV CUR MEDS BY ELIG CLIN: HCPCS | Performed by: FAMILY MEDICINE

## 2018-11-30 PROCEDURE — 1123F ACP DISCUSS/DSCN MKR DOCD: CPT | Performed by: FAMILY MEDICINE

## 2018-11-30 PROCEDURE — 99213 OFFICE O/P EST LOW 20 MIN: CPT | Performed by: FAMILY MEDICINE

## 2018-11-30 PROCEDURE — G8420 CALC BMI NORM PARAMETERS: HCPCS | Performed by: FAMILY MEDICINE

## 2018-11-30 PROCEDURE — 1090F PRES/ABSN URINE INCON ASSESS: CPT | Performed by: FAMILY MEDICINE

## 2018-11-30 PROCEDURE — G8599 NO ASA/ANTIPLAT THER USE RNG: HCPCS | Performed by: FAMILY MEDICINE

## 2018-11-30 PROCEDURE — 1101F PT FALLS ASSESS-DOCD LE1/YR: CPT | Performed by: FAMILY MEDICINE

## 2018-11-30 PROCEDURE — 4040F PNEUMOC VAC/ADMIN/RCVD: CPT | Performed by: FAMILY MEDICINE

## 2018-11-30 RX ORDER — VALACYCLOVIR HYDROCHLORIDE 1 G/1
TABLET, FILM COATED ORAL
Qty: 14 TABLET | Refills: 1 | Status: SHIPPED | OUTPATIENT
Start: 2018-11-30 | End: 2019-01-23 | Stop reason: SDUPTHER

## 2018-12-21 ENCOUNTER — TELEPHONE (OUTPATIENT)
Dept: DERMATOLOGY | Age: 73
End: 2018-12-21

## 2018-12-24 ENCOUNTER — OFFICE VISIT (OUTPATIENT)
Dept: FAMILY MEDICINE CLINIC | Age: 73
End: 2018-12-24
Payer: MEDICARE

## 2018-12-24 VITALS
DIASTOLIC BLOOD PRESSURE: 70 MMHG | TEMPERATURE: 98 F | SYSTOLIC BLOOD PRESSURE: 118 MMHG | HEART RATE: 56 BPM | WEIGHT: 120 LBS | OXYGEN SATURATION: 98 % | RESPIRATION RATE: 16 BRPM | BODY MASS INDEX: 21.95 KG/M2

## 2018-12-24 DIAGNOSIS — E02 SUBCLINICAL IODINE-DEFICIENCY HYPOTHYROIDISM: ICD-10-CM

## 2018-12-24 DIAGNOSIS — R21 SKIN RASH: Primary | ICD-10-CM

## 2018-12-24 DIAGNOSIS — E78.2 MIXED HYPERLIPIDEMIA: ICD-10-CM

## 2018-12-24 DIAGNOSIS — K13.70 MOUTH LESION: ICD-10-CM

## 2018-12-24 PROCEDURE — G8399 PT W/DXA RESULTS DOCUMENT: HCPCS | Performed by: NURSE PRACTITIONER

## 2018-12-24 PROCEDURE — 1101F PT FALLS ASSESS-DOCD LE1/YR: CPT | Performed by: NURSE PRACTITIONER

## 2018-12-24 PROCEDURE — 3017F COLORECTAL CA SCREEN DOC REV: CPT | Performed by: NURSE PRACTITIONER

## 2018-12-24 PROCEDURE — G8599 NO ASA/ANTIPLAT THER USE RNG: HCPCS | Performed by: NURSE PRACTITIONER

## 2018-12-24 PROCEDURE — G8420 CALC BMI NORM PARAMETERS: HCPCS | Performed by: NURSE PRACTITIONER

## 2018-12-24 PROCEDURE — G8427 DOCREV CUR MEDS BY ELIG CLIN: HCPCS | Performed by: NURSE PRACTITIONER

## 2018-12-24 PROCEDURE — 1123F ACP DISCUSS/DSCN MKR DOCD: CPT | Performed by: NURSE PRACTITIONER

## 2018-12-24 PROCEDURE — 99214 OFFICE O/P EST MOD 30 MIN: CPT | Performed by: NURSE PRACTITIONER

## 2018-12-24 PROCEDURE — G8482 FLU IMMUNIZE ORDER/ADMIN: HCPCS | Performed by: NURSE PRACTITIONER

## 2018-12-24 PROCEDURE — 4040F PNEUMOC VAC/ADMIN/RCVD: CPT | Performed by: NURSE PRACTITIONER

## 2018-12-24 PROCEDURE — 1036F TOBACCO NON-USER: CPT | Performed by: NURSE PRACTITIONER

## 2018-12-24 PROCEDURE — 1090F PRES/ABSN URINE INCON ASSESS: CPT | Performed by: NURSE PRACTITIONER

## 2018-12-24 RX ORDER — MOMETASONE FUROATE 1 MG/G
CREAM TOPICAL
Qty: 1 TUBE | Refills: 3 | Status: SHIPPED | OUTPATIENT
Start: 2018-12-24 | End: 2021-09-20

## 2018-12-24 NOTE — PROGRESS NOTES
Subjective:      Chief Complaint   Patient presents with    Rash     itchy, red, flaky, large patch L ear & neck x 4 wks - getting worse    Mouth Lesions     on 2nd dose of Valtrex        Patient ID: Orlando Palacio is a 75 y.o. female who presents for rash on the left side of her face. She has hx of \"cold sore \"re occurring too. The top of her lip had tiny blisters until the skin flaked off and now she has this reddened area. She had a new area behind left ear which appears to be similar to lip area. Took Valtrex but is off now completely. She has had rash for 5 weeks. Rash   This is a new problem. The current episode started in the past 7 days. The problem has been gradually worsening since onset. The affected locations include the lips and left ear. The rash is characterized by itchiness, dryness and redness. She was exposed to nothing. Past treatments include nothing. Family History   Problem Relation Age of Onset    Ovarian Cancer Mother     Colon Cancer Mother 71    Other Mother         brain tumor - ? CA    Cancer Mother         colon    Heart Disease Father     Diabetes Father     High Blood Pressure Father     Mult Sclerosis Sister     Arthritis Brother        Social History     Social History    Marital status:      Spouse name: N/A    Number of children: N/A    Years of education: N/A     Occupational History    Not on file.      Social History Main Topics    Smoking status: Former Smoker     Packs/day: 0.05     Years: 14.00     Types: Cigarettes     Quit date: 1/1/1976    Smokeless tobacco: Never Used      Comment: smoked sometimes    Alcohol use 4.2 oz/week     7 Glasses of wine per week    Drug use: No    Sexual activity: Yes     Partners: Male     Other Topics Concern    Not on file     Social History Narrative    No narrative on file       Current Outpatient Prescriptions on File Prior to Visit   Medication Sig Dispense Refill    valACYclovir (VALTREX) 1 g tablet 2 pills po X 1 dose then bit X 6 days additional. 14 tablet 1    levothyroxine (SYNTHROID) 75 MCG tablet TAKE ONE TABLET BY MOUTH DAILY 90 tablet 3    atorvastatin (LIPITOR) 20 MG tablet Take 20 mg by mouth daily      Ascorbic Acid (VITAMIN C) 500 MG tablet Take 1,000 mg by mouth daily.  Calcium-Vitamin D 600-200 MG-UNIT TABS Take  by mouth 2 times daily.  MULTIPLE VITAMIN PO Take  by mouth.  OMEGA 3 1000 MG CAPS Take  by mouth daily.  Coenzyme Q-10 100 MG CAPS Take  by mouth. Current Facility-Administered Medications on File Prior to Visit   Medication Dose Route Frequency Provider Last Rate Last Dose    lactated ringers infusion   Intravenous Continuous Oliver Reynoso MD        lidocaine PF 1 % (PF) injection 5 mL  5 mL Intradermal Once Oliver Reynoso MD           Review of Systems   Respiratory: Negative. Cardiovascular: Negative. Hyperlipidemia last check was in December of this year his total is 161 HDL 70 LDL 76 triglycerides 75 controlled with Lipitor   Endocrine:        Hypothyroidism last check was in May of this year his TSH was normal at 0.87 will continue on same dose of Synthroid   Skin: Positive for rash. History of viral disorders   Allergic/Immunologic: Positive for environmental allergies. Neurological: Negative. Objective:     Physical Exam   Constitutional: She is oriented to person, place, and time. She appears well-developed and well-nourished. HENT:   Head: Normocephalic and atraumatic. Eyes: Conjunctivae are normal.   Neck: Normal range of motion. Neck supple. No thyromegaly present. Cardiovascular: Regular rhythm and normal heart sounds. Exam reveals no gallop and no friction rub. No murmur heard. Pulmonary/Chest: Effort normal and breath sounds normal. She has no wheezes. She has no rales. Abdominal: Soft. Musculoskeletal: Normal range of motion. Lymphadenopathy:     She has no cervical adenopathy.

## 2019-01-10 ENCOUNTER — OFFICE VISIT (OUTPATIENT)
Dept: DERMATOLOGY | Age: 74
End: 2019-01-10
Payer: MEDICARE

## 2019-01-10 DIAGNOSIS — D22.9 MULTIPLE NEVI: Primary | ICD-10-CM

## 2019-01-10 DIAGNOSIS — Z87.2 HISTORY OF ACTINIC KERATOSES: ICD-10-CM

## 2019-01-10 DIAGNOSIS — R21 RASH: ICD-10-CM

## 2019-01-10 DIAGNOSIS — L82.1 SEBORRHEIC KERATOSIS: ICD-10-CM

## 2019-01-10 DIAGNOSIS — L81.4 LENTIGINES: ICD-10-CM

## 2019-01-10 PROCEDURE — G8599 NO ASA/ANTIPLAT THER USE RNG: HCPCS | Performed by: DERMATOLOGY

## 2019-01-10 PROCEDURE — G8399 PT W/DXA RESULTS DOCUMENT: HCPCS | Performed by: DERMATOLOGY

## 2019-01-10 PROCEDURE — 1036F TOBACCO NON-USER: CPT | Performed by: DERMATOLOGY

## 2019-01-10 PROCEDURE — G8420 CALC BMI NORM PARAMETERS: HCPCS | Performed by: DERMATOLOGY

## 2019-01-10 PROCEDURE — 3017F COLORECTAL CA SCREEN DOC REV: CPT | Performed by: DERMATOLOGY

## 2019-01-10 PROCEDURE — 1123F ACP DISCUSS/DSCN MKR DOCD: CPT | Performed by: DERMATOLOGY

## 2019-01-10 PROCEDURE — 1090F PRES/ABSN URINE INCON ASSESS: CPT | Performed by: DERMATOLOGY

## 2019-01-10 PROCEDURE — 4040F PNEUMOC VAC/ADMIN/RCVD: CPT | Performed by: DERMATOLOGY

## 2019-01-10 PROCEDURE — 99214 OFFICE O/P EST MOD 30 MIN: CPT | Performed by: DERMATOLOGY

## 2019-01-10 PROCEDURE — G8427 DOCREV CUR MEDS BY ELIG CLIN: HCPCS | Performed by: DERMATOLOGY

## 2019-01-10 PROCEDURE — G8482 FLU IMMUNIZE ORDER/ADMIN: HCPCS | Performed by: DERMATOLOGY

## 2019-01-10 PROCEDURE — 1101F PT FALLS ASSESS-DOCD LE1/YR: CPT | Performed by: DERMATOLOGY

## 2019-01-23 ENCOUNTER — PATIENT MESSAGE (OUTPATIENT)
Dept: FAMILY MEDICINE CLINIC | Age: 74
End: 2019-01-23

## 2019-01-23 ENCOUNTER — TELEPHONE (OUTPATIENT)
Dept: DERMATOLOGY | Age: 74
End: 2019-01-23

## 2019-01-23 DIAGNOSIS — K13.0 SORE LIP: ICD-10-CM

## 2019-01-23 RX ORDER — VALACYCLOVIR HYDROCHLORIDE 1 G/1
TABLET, FILM COATED ORAL
Qty: 14 TABLET | Refills: 1 | Status: SHIPPED | OUTPATIENT
Start: 2019-01-23 | End: 2021-09-20

## 2019-02-06 ENCOUNTER — TELEPHONE (OUTPATIENT)
Dept: FAMILY MEDICINE CLINIC | Age: 74
End: 2019-02-06

## 2019-02-06 DIAGNOSIS — N39.0 URINARY TRACT INFECTION WITH HEMATURIA, SITE UNSPECIFIED: Primary | ICD-10-CM

## 2019-02-06 DIAGNOSIS — R31.9 URINARY TRACT INFECTION WITH HEMATURIA, SITE UNSPECIFIED: Primary | ICD-10-CM

## 2019-02-06 RX ORDER — CIPROFLOXACIN 500 MG/1
500 TABLET, FILM COATED ORAL 2 TIMES DAILY
Qty: 10 TABLET | Refills: 0 | Status: SHIPPED | OUTPATIENT
Start: 2019-02-06 | End: 2019-02-11

## 2019-03-26 ENCOUNTER — PROCEDURE VISIT (OUTPATIENT)
Dept: DERMATOLOGY | Age: 74
End: 2019-03-26

## 2019-03-26 DIAGNOSIS — L81.4 LENTIGINES: Primary | ICD-10-CM

## 2019-03-26 DIAGNOSIS — Z41.1 ELECTIVE PROCEDURE FOR UNACCEPTABLE COSMETIC APPEARANCE: ICD-10-CM

## 2019-03-26 PROCEDURE — DM00700 PR DERM ONLY VI ACNE: Performed by: DERMATOLOGY

## 2019-03-26 PROCEDURE — 99999 PR OFFICE/OUTPT VISIT,PROCEDURE ONLY: CPT | Performed by: DERMATOLOGY

## 2019-04-30 ENCOUNTER — TELEPHONE (OUTPATIENT)
Dept: DERMATOLOGY | Age: 74
End: 2019-04-30

## 2019-05-20 DIAGNOSIS — E03.9 HYPOTHYROIDISM, UNSPECIFIED TYPE: ICD-10-CM

## 2019-05-20 RX ORDER — LEVOTHYROXINE SODIUM 0.07 MG/1
75 TABLET ORAL DAILY
Qty: 90 TABLET | Refills: 0 | Status: SHIPPED | OUTPATIENT
Start: 2019-05-20 | End: 2019-05-21 | Stop reason: SDUPTHER

## 2019-05-20 NOTE — TELEPHONE ENCOUNTER
RX was sent, but ensure this is where it should have been sent. Please also facilitate scheduling a follow up appointment.

## 2019-05-21 RX ORDER — LEVOTHYROXINE SODIUM 0.07 MG/1
75 TABLET ORAL DAILY
Qty: 90 TABLET | Refills: 0 | Status: SHIPPED | OUTPATIENT
Start: 2019-05-21 | End: 2019-08-26 | Stop reason: SDUPTHER

## 2019-05-21 NOTE — TELEPHONE ENCOUNTER
Patient wanted this sent to the [facility] in this encounter.  Patient was advised about scheduled appointment 5-31-19

## 2019-05-31 ENCOUNTER — OFFICE VISIT (OUTPATIENT)
Dept: FAMILY MEDICINE CLINIC | Age: 74
End: 2019-05-31
Payer: MEDICARE

## 2019-05-31 VITALS
HEART RATE: 60 BPM | SYSTOLIC BLOOD PRESSURE: 114 MMHG | OXYGEN SATURATION: 100 % | DIASTOLIC BLOOD PRESSURE: 63 MMHG | WEIGHT: 121 LBS | BODY MASS INDEX: 22.13 KG/M2 | TEMPERATURE: 95.4 F | RESPIRATION RATE: 16 BRPM

## 2019-05-31 DIAGNOSIS — L30.9 ECZEMA, UNSPECIFIED TYPE: ICD-10-CM

## 2019-05-31 DIAGNOSIS — M76.30 ILIOTIBIAL BAND SYNDROME, UNSPECIFIED LATERALITY: ICD-10-CM

## 2019-05-31 DIAGNOSIS — I25.10 CORONARY ARTERY DISEASE INVOLVING NATIVE HEART WITHOUT ANGINA PECTORIS, UNSPECIFIED VESSEL OR LESION TYPE: ICD-10-CM

## 2019-05-31 DIAGNOSIS — E78.00 PURE HYPERCHOLESTEROLEMIA: ICD-10-CM

## 2019-05-31 DIAGNOSIS — E03.9 HYPOTHYROIDISM, UNSPECIFIED TYPE: Primary | ICD-10-CM

## 2019-05-31 PROCEDURE — G8599 NO ASA/ANTIPLAT THER USE RNG: HCPCS | Performed by: FAMILY MEDICINE

## 2019-05-31 PROCEDURE — 1123F ACP DISCUSS/DSCN MKR DOCD: CPT | Performed by: FAMILY MEDICINE

## 2019-05-31 PROCEDURE — G8399 PT W/DXA RESULTS DOCUMENT: HCPCS | Performed by: FAMILY MEDICINE

## 2019-05-31 PROCEDURE — 4040F PNEUMOC VAC/ADMIN/RCVD: CPT | Performed by: FAMILY MEDICINE

## 2019-05-31 PROCEDURE — G8427 DOCREV CUR MEDS BY ELIG CLIN: HCPCS | Performed by: FAMILY MEDICINE

## 2019-05-31 PROCEDURE — 3017F COLORECTAL CA SCREEN DOC REV: CPT | Performed by: FAMILY MEDICINE

## 2019-05-31 PROCEDURE — 99214 OFFICE O/P EST MOD 30 MIN: CPT | Performed by: FAMILY MEDICINE

## 2019-05-31 PROCEDURE — G8420 CALC BMI NORM PARAMETERS: HCPCS | Performed by: FAMILY MEDICINE

## 2019-05-31 PROCEDURE — 1036F TOBACCO NON-USER: CPT | Performed by: FAMILY MEDICINE

## 2019-05-31 PROCEDURE — 1090F PRES/ABSN URINE INCON ASSESS: CPT | Performed by: FAMILY MEDICINE

## 2019-05-31 RX ORDER — LEVOTHYROXINE SODIUM 0.07 MG/1
75 TABLET ORAL DAILY
Qty: 90 TABLET | Refills: 1 | Status: CANCELLED | OUTPATIENT
Start: 2019-05-31

## 2019-05-31 ASSESSMENT — PATIENT HEALTH QUESTIONNAIRE - PHQ9
SUM OF ALL RESPONSES TO PHQ QUESTIONS 1-9: 0
2. FEELING DOWN, DEPRESSED OR HOPELESS: 0
SUM OF ALL RESPONSES TO PHQ9 QUESTIONS 1 & 2: 0
1. LITTLE INTEREST OR PLEASURE IN DOING THINGS: 0
SUM OF ALL RESPONSES TO PHQ QUESTIONS 1-9: 0

## 2019-05-31 NOTE — PATIENT INSTRUCTIONS
Patient Education        Iliotibial Band Syndrome: Exercises  Your Care Instructions  Here are some examples of typical rehabilitation exercises for your condition. Start each exercise slowly. Ease off the exercise if you start to have pain. Your doctor or physical therapist will tell you when you can start these exercises and which ones will work best for you. How to do the exercises  Iliotibial band stretch    1. Lean sideways against a wall. If you are not steady on your feet, hold on to a chair or counter. 2. Stand on the leg with the affected hip, with that leg close to the wall. Then cross your other leg in front of it. 3. Let your affected hip drop out to the side of your body and against the wall. Then lean away from your affected hip until you feel a stretch. 4. Hold the stretch for 15 to 30 seconds. 5. Repeat 2 to 4 times. Piriformis stretch    1. Lie on your back with your legs straight. 2. Lift your affected leg and bend your knee. With your opposite hand, reach across your body, and then gently pull your knee toward your opposite shoulder. 3. Hold the stretch for 15 to 30 seconds. 4. Repeat 2 to 4 times. Hamstring wall stretch    1. Lie on your back in a doorway, with your good leg through the open door. 2. Slide your affected leg up the wall to straighten your knee. You should feel a gentle stretch down the back of your leg. 1. Do not arch your back. 2. Do not bend either knee. 3. Keep one heel touching the floor and the other heel touching the wall. Do not point your toes. 3. Hold the stretch for at least 1 minute to begin. Then try to lengthen the time you hold the stretch to as long as 6 minutes. 4. Repeat 2 to 4 times. 5. If you do not have a place to do this exercise in a doorway, there is another way to do it:  6. Lie on your back, and bend the knee of your affected leg.   7. Loop a towel under the ball and toes of that foot, and hold the ends of the towel in your hands.  8. Straighten your knee, and slowly pull back on the towel. You should feel a gentle stretch down the back of your leg. 9. Hold the stretch for 15 to 30 seconds. Or even better, hold the stretch for 1 minute if you can. 10. Repeat 2 to 4 times. Follow-up care is a key part of your treatment and safety. Be sure to make and go to all appointments, and call your doctor if you are having problems. It's also a good idea to know your test results and keep a list of the medicines you take. Where can you learn more? Go to [email or web address] and sign in to your Sanswire account. Enter P252 in the SPark! box to learn more about \"Iliotibial Band Syndrome: Exercises. \"     If you do not have an account, please click on the \"Sign Up Now\" link. Current as of: September 20, 2018  Content Version: 12.0  © 3889-8779 [employer]orporated. Care instructions adapted under license by [facility] ([facility]). If you have questions about a medical condition or this instruction, always ask your healthcare professional. [place] [address] any warranty or liability for your use of this information.

## 2019-05-31 NOTE — PROGRESS NOTES
Patient is here for follow up of hypercholesterolemia, CAD and hypothyroidism. She sees a cardiologist through [facility],      She is using Elocon for skin patches /rash off and on to neck and anterior right ear. She has occasionally low back pain - left sided. She has been working with [employer] 3 weeks. No radiation to legs or weakness. Worked out on Wednesday and helped. Walks 3-4 miles/day . Goes to gym 3d/ week. Review of Systems    ROS: All other systems were reviewed and are negative . Patient's allergies and medications were reviewed. Patient's past medical, surgical, social , and family history were reviewed. OBJECTIVE:  /63   Pulse 60   Temp 95.4 °F (35.2 °C) (Oral)   Resp 16   Wt 121 lb (54.9 kg)   SpO2 100%   Breastfeeding? No   BMI 22.13 kg/m²     Physical Exam    General: NAD, cooperative, alert and oriented X 3. Mood / affect is good. good insight. well hydrated. Neck : no lymphadenopathy, supple, FROM  CV: Regular rate and rhythm , no murmurs/ rub/ gallop. No edema. Lungs : CTA bilaterally, breathing comfortably  Abdomen: positive bowel sounds, soft , non tender, non distended. No hepatosplenomegaly. No CVA tenderness. Lower extremities : DTRs 2+ knees and ankles bilateral.  FROM. Strength 5/5. Negative straight leg-raise. No edema or erythema bilateral. Tenderness to ITB bilateral.   Skin: no rashes. Non tender. ASSESSMENT/  PLAN:  1. Hypothyroidism, unspecified type  - stable. Continue Synthroid 0.075 mg qd.   - TSH without Reflex; Future, T4, Free; Future    2. Coronary artery disease involving native heart without angina pectoris, unspecified vessel or lesion type  - continue Lipitor 20 mg qd . - Comprehensive Metabolic Panel; Future  - Lipid Panel; Future    3. Pure hypercholesterolemia  - stable. Continue Lipitor 20 mg qd and low cholesterol diet. - Comprehensive Metabolic Panel; Future, Lipid Panel; Future    4. Eczema, unspecified type  - stable. Elocon ointment prn bid. 5. Iliotibial band syndrome, unspecified laterality  - Moist heat . ROM exercises. Modify activities. - Aleve 1-2 po bid prn. Follow up 6 months/ prn.

## 2019-06-18 LAB
ALBUMIN SERPL-MCNC: 4.2 G/DL
ALP BLD-CCNC: 74 U/L
ALT SERPL-CCNC: 26 U/L
ANION GAP SERPL CALCULATED.3IONS-SCNC: 8 MMOL/L
AST SERPL-CCNC: 27 U/L
BILIRUB SERPL-MCNC: 0.8 MG/DL (ref 0.1–1.4)
BUN BLDV-MCNC: 15 MG/DL
CALCIUM SERPL-MCNC: 9.5 MG/DL
CHLORIDE BLD-SCNC: 102 MMOL/L
CHOLESTEROL, TOTAL: 158 MG/DL
CHOLESTEROL/HDL RATIO: 2.7
CO2: 27 MMOL/L
CREAT SERPL-MCNC: 0.72 MG/DL
GFR CALCULATED: 79
GLUCOSE BLD-MCNC: 81 MG/DL
HDLC SERPL-MCNC: 59 MG/DL (ref 35–70)
LDL CHOLESTEROL CALCULATED: 80 MG/DL (ref 0–160)
POTASSIUM SERPL-SCNC: 4.3 MMOL/L
SODIUM BLD-SCNC: 137 MMOL/L
T4 FREE: 1.4
TOTAL PROTEIN: 6.6
TRIGL SERPL-MCNC: 93 MG/DL
TSH SERPL DL<=0.05 MIU/L-ACNC: 0.38 UIU/ML
VLDLC SERPL CALC-MCNC: NORMAL MG/DL

## 2019-06-19 NOTE — TELEPHONE ENCOUNTER
Tried to reach patient-mailbox not set up to leave voice mail-  Spoke to patient to scheduled for chemical peel and she questioned why it took so long to get the product in? I advised patient that with our new ordering system that the product had to be approved and added to the system. Patient says that she will call back after she sees what is going on with her schedule.

## 2019-06-24 ENCOUNTER — TELEPHONE (OUTPATIENT)
Dept: FAMILY MEDICINE CLINIC | Age: 74
End: 2019-06-24

## 2019-06-24 DIAGNOSIS — E03.9 HYPOTHYROIDISM, UNSPECIFIED TYPE: ICD-10-CM

## 2019-06-24 DIAGNOSIS — I25.10 CORONARY ARTERY DISEASE INVOLVING NATIVE HEART WITHOUT ANGINA PECTORIS, UNSPECIFIED VESSEL OR LESION TYPE: ICD-10-CM

## 2019-06-24 DIAGNOSIS — E78.00 PURE HYPERCHOLESTEROLEMIA: ICD-10-CM

## 2019-06-24 NOTE — TELEPHONE ENCOUNTER
[facility] on [address] [place] [address] at [phone] to request pt blood test results. They will be faxing the results to our office today.

## 2019-08-26 ENCOUNTER — OFFICE VISIT (OUTPATIENT)
Dept: ORTHOPEDIC SURGERY | Age: 74
End: 2019-08-26
Payer: MEDICARE

## 2019-08-26 VITALS — HEIGHT: 62 IN | WEIGHT: 121.03 LBS | BODY MASS INDEX: 22.27 KG/M2

## 2019-08-26 DIAGNOSIS — E03.9 HYPOTHYROIDISM, UNSPECIFIED TYPE: ICD-10-CM

## 2019-08-26 DIAGNOSIS — M79.641 RIGHT HAND PAIN: Primary | ICD-10-CM

## 2019-08-26 DIAGNOSIS — M65.331 TRIGGER MIDDLE FINGER OF RIGHT HAND: ICD-10-CM

## 2019-08-26 DIAGNOSIS — R20.0 HAND NUMBNESS: ICD-10-CM

## 2019-08-26 DIAGNOSIS — M19.049 CMC ARTHRITIS: ICD-10-CM

## 2019-08-26 DIAGNOSIS — R20.0 NUMBNESS OF RIGHT HAND: ICD-10-CM

## 2019-08-26 PROCEDURE — 4040F PNEUMOC VAC/ADMIN/RCVD: CPT | Performed by: ORTHOPAEDIC SURGERY

## 2019-08-26 PROCEDURE — G8599 NO ASA/ANTIPLAT THER USE RNG: HCPCS | Performed by: ORTHOPAEDIC SURGERY

## 2019-08-26 PROCEDURE — 3017F COLORECTAL CA SCREEN DOC REV: CPT | Performed by: ORTHOPAEDIC SURGERY

## 2019-08-26 PROCEDURE — G8427 DOCREV CUR MEDS BY ELIG CLIN: HCPCS | Performed by: ORTHOPAEDIC SURGERY

## 2019-08-26 PROCEDURE — 1123F ACP DISCUSS/DSCN MKR DOCD: CPT | Performed by: ORTHOPAEDIC SURGERY

## 2019-08-26 PROCEDURE — 1090F PRES/ABSN URINE INCON ASSESS: CPT | Performed by: ORTHOPAEDIC SURGERY

## 2019-08-26 PROCEDURE — G8399 PT W/DXA RESULTS DOCUMENT: HCPCS | Performed by: ORTHOPAEDIC SURGERY

## 2019-08-26 PROCEDURE — 99203 OFFICE O/P NEW LOW 30 MIN: CPT | Performed by: ORTHOPAEDIC SURGERY

## 2019-08-26 PROCEDURE — G8420 CALC BMI NORM PARAMETERS: HCPCS | Performed by: ORTHOPAEDIC SURGERY

## 2019-08-26 PROCEDURE — L3924 HFO WITHOUT JOINTS PRE OTS: HCPCS | Performed by: ORTHOPAEDIC SURGERY

## 2019-08-26 PROCEDURE — 1036F TOBACCO NON-USER: CPT | Performed by: ORTHOPAEDIC SURGERY

## 2019-08-26 RX ORDER — LEVOTHYROXINE SODIUM 0.07 MG/1
75 TABLET ORAL DAILY
Qty: 90 TABLET | Refills: 3 | Status: SHIPPED | OUTPATIENT
Start: 2019-08-26 | End: 2020-05-12

## 2019-08-26 RX ORDER — LEVOTHYROXINE SODIUM 0.07 MG/1
TABLET ORAL
Qty: 90 TABLET | Refills: 0 | OUTPATIENT
Start: 2019-08-26

## 2019-08-26 NOTE — PROGRESS NOTES
Assessment: #1 right first [facility] arthritis moderate to severely symptomatic. #2 right middle trigger finger. #3 bilateral right greater than left symptoms of carpal tunnel syndrome with exquisitely positive Tinel sign    Treatment Plan: I would suspect given the length of time she has had her carpal tunnel symptoms even though the relatively mild she is probably going to require carpal tunnel release surgery. We are therefore just going to fit her with a neoprene thumb stabilization splint while we get EMG nerve conduction studies and not to treat the trigger finger until we see how severe her EMGs are. I am not certain she is symptomatic enough to warrant operative intervention on her [facility] joint but we will discuss this further when she returns today we have fitted her with a neoprene thumb stabilization splint    Return for after EMG. Chief Complaint:  Hand Pain (pain in the base of right thumb and right middle finger locks)      History of Present Illness  Jose Alfredo Aburto is a 75 y.o. female. Location: right thumb and right middle finger Severity: right thumb has moderate pain, right middle finger does not hurt but it \"catches\" Duration: gradually over the last year Modifying factors: right thumb pain with any pressure or gripping, weakness. Associated symptoms: none    Contributory History  None    Medical History    Current Outpatient Medications on File Prior to Visit   Medication Sig Dispense Refill    levothyroxine (SYNTHROID) 75 MCG tablet Take 1 tablet by mouth Daily FOLLOW UP APPOINTMENT AND LABS ARE NEEDED. 90 tablet 0    valACYclovir (VALTREX) 1 g tablet 2 pills po X 1 dose then bit X 6 days additional. 14 tablet 1    mometasone (ELOCON) 0.1 % cream Apply topically daily. 1 Tube 3    atorvastatin (LIPITOR) 20 MG tablet Take 20 mg by mouth daily      Ascorbic Acid (VITAMIN C) 500 MG tablet Take 1,000 mg by mouth daily.       Calcium-Vitamin D 600-200 MG-UNIT TABS Take  by mouth 2 times daily.      MULTIPLE VITAMIN PO Take  by mouth.  OMEGA 3 1000 MG CAPS Take  by mouth daily.  Coenzyme Q-10 100 MG CAPS Take  by mouth. Current Facility-Administered Medications on File Prior to Visit   Medication Dose Route Frequency Provider Last Rate Last Dose    lactated ringers infusion   Intravenous Continuous Jose Francisco Seaman MD        lidocaine PF 1 % (PF) injection 5 mL  5 mL Intradermal Once Jose Francisco Seaman MD         Past Medical History:   Diagnosis Date    CAD (coronary artery disease)     per CT cardiac [id number]    Colon polyp     Compression fracture of L2 ([address] 2017    DDD (degenerative disc disease), lumbar     L2-3; L3-4, L4-5, L5-S1    Hyperlipidemia     Hypothyroidism     Lateral meniscal tear 12/15    Osteoarthritis of right knee 12/15    Osteopenia     per DEXA     Allergies   Allergen Reactions    Bactrim Ds [Sulfamethoxazole-Trimethoprim] Other (See Comments)     Very bad headache     Social History     Socioeconomic History    Marital status:      Spouse name: Not on file    Number of children: Not on file    Years of education: Not on file    Highest education level: Not on file   Occupational History    Not on file   Social Needs    Financial resource strain: Not on file    Food insecurity:     Worry: Not on file     Inability: Not on file    Transportation needs:     Medical: Not on file     Non-medical: Not on file   Tobacco Use    Smoking status: Former Smoker     Packs/day: 0.05     Years: 14.00     Pack years: 0.70     Types: Cigarettes     Last attempt to quit: 1976     Years since quittin.6    Smokeless tobacco: Never Used    Tobacco comment: smoked sometimes   Substance and Sexual Activity    Alcohol use:  Yes     Alcohol/week: 7.0 standard drinks     Types: 7 Glasses of wine per week    Drug use: No    Sexual activity: Yes     Partners: Male   Lifestyle    Physical activity:     Days per week: Not on file decreased APB strength  Extrinsic Muscle Strength: Normal  Special Tests: Strongly positive thumb ballottement thumb grind test    Left Hand Examination:  Inspection: No atrophy  Finger Range of Motion: Full  Wrist Range of Motion: Normal  Vascular Exam: Normal capillary refill  Neurologic Exam: Negative Tinel's and Phalen's  Intrinsic Muscle Strength: Normal  Extrinsic Muscle Strength: Normal  Special Tests: Minimally positive thumb ballottement thumb grind test    Neck Exam: Full range of motion no apparent pain    Additional Comments:     Additional Examinations:  X-Ray Findings: PA lateral oblique x-rays of the right hand show grade 2 osteoarthritis of the first carpometacarpal joint additional Diagnostic Test Findings:    Office Procedures:       Time statement: This dictation was performed with a verbal recognition program. It is possible that there are still dictated errors within this office note. All efforts were made to ensure that this office note is accurate. Orders Placed This Encounter   Procedures    XR HAND RIGHT (MIN 3 VIEWS)     Standing Status:   Future     Number of Occurrences:   1     Standing Expiration Date:   8/26/2020    EMG     EMG BUE     Order Specific Question:   Which body part? Answer:   Jared Garza [facility] Controller Plus     Patient was prescribed a Tanesha Mccoy [facility] Controller Plus Thumb Splint. The right thumb will require stabilization / immobilization from this semi-rigid / rigid orthosis to improve their function. The orthosis will assist in protecting the affected area, provide functional support and facilitate healing. The patient was educated and fit by a healthcare professional with expert knowledge and specialization in brace application while under the direct supervision of the physician. Verbal and written instructions for the use of and application of this item were provided.    They were instructed to contact the office immediately should the brace

## 2019-09-19 ENCOUNTER — OFFICE VISIT (OUTPATIENT)
Dept: ORTHOPEDIC SURGERY | Age: 74
End: 2019-09-19
Payer: MEDICARE

## 2019-09-19 DIAGNOSIS — R20.2 PARESTHESIA AND PAIN OF BOTH UPPER EXTREMITIES: ICD-10-CM

## 2019-09-19 DIAGNOSIS — M79.602 PARESTHESIA AND PAIN OF BOTH UPPER EXTREMITIES: ICD-10-CM

## 2019-09-19 DIAGNOSIS — M79.602 PAIN IN BOTH UPPER EXTREMITIES: Primary | ICD-10-CM

## 2019-09-19 DIAGNOSIS — M79.601 PAIN IN BOTH UPPER EXTREMITIES: Primary | ICD-10-CM

## 2019-09-19 DIAGNOSIS — M79.601 PARESTHESIA AND PAIN OF BOTH UPPER EXTREMITIES: ICD-10-CM

## 2019-09-19 PROCEDURE — 95911 NRV CNDJ TEST 9-10 STUDIES: CPT | Performed by: PHYSICAL MEDICINE & REHABILITATION

## 2019-09-19 PROCEDURE — 95886 MUSC TEST DONE W/N TEST COMP: CPT | Performed by: PHYSICAL MEDICINE & REHABILITATION

## 2019-10-07 ENCOUNTER — OFFICE VISIT (OUTPATIENT)
Dept: ORTHOPEDIC SURGERY | Age: 74
End: 2019-10-07
Payer: MEDICARE

## 2019-10-07 VITALS — HEIGHT: 62 IN | RESPIRATION RATE: 16 BRPM | BODY MASS INDEX: 22.27 KG/M2 | WEIGHT: 121.03 LBS

## 2019-10-07 DIAGNOSIS — M18.11 PRIMARY OSTEOARTHRITIS OF FIRST CARPOMETACARPAL JOINT OF RIGHT HAND: ICD-10-CM

## 2019-10-07 DIAGNOSIS — G56.01 CARPAL TUNNEL SYNDROME ON RIGHT: Primary | ICD-10-CM

## 2019-10-07 DIAGNOSIS — G56.02 CARPAL TUNNEL SYNDROME ON LEFT: ICD-10-CM

## 2019-10-07 DIAGNOSIS — M65.331 TRIGGER MIDDLE FINGER OF RIGHT HAND: ICD-10-CM

## 2019-10-07 PROCEDURE — 99213 OFFICE O/P EST LOW 20 MIN: CPT | Performed by: ORTHOPAEDIC SURGERY

## 2019-10-07 PROCEDURE — 1123F ACP DISCUSS/DSCN MKR DOCD: CPT | Performed by: ORTHOPAEDIC SURGERY

## 2019-10-07 PROCEDURE — G8599 NO ASA/ANTIPLAT THER USE RNG: HCPCS | Performed by: ORTHOPAEDIC SURGERY

## 2019-10-07 PROCEDURE — 1036F TOBACCO NON-USER: CPT | Performed by: ORTHOPAEDIC SURGERY

## 2019-10-07 PROCEDURE — G8420 CALC BMI NORM PARAMETERS: HCPCS | Performed by: ORTHOPAEDIC SURGERY

## 2019-10-07 PROCEDURE — G8484 FLU IMMUNIZE NO ADMIN: HCPCS | Performed by: ORTHOPAEDIC SURGERY

## 2019-10-07 PROCEDURE — 3017F COLORECTAL CA SCREEN DOC REV: CPT | Performed by: ORTHOPAEDIC SURGERY

## 2019-10-07 PROCEDURE — 4040F PNEUMOC VAC/ADMIN/RCVD: CPT | Performed by: ORTHOPAEDIC SURGERY

## 2019-10-07 PROCEDURE — 1090F PRES/ABSN URINE INCON ASSESS: CPT | Performed by: ORTHOPAEDIC SURGERY

## 2019-10-07 PROCEDURE — G8427 DOCREV CUR MEDS BY ELIG CLIN: HCPCS | Performed by: ORTHOPAEDIC SURGERY

## 2019-10-07 PROCEDURE — G8399 PT W/DXA RESULTS DOCUMENT: HCPCS | Performed by: ORTHOPAEDIC SURGERY

## 2019-10-14 ENCOUNTER — TELEPHONE (OUTPATIENT)
Dept: ORTHOPEDIC SURGERY | Age: 74
End: 2019-10-14

## 2019-10-16 ENCOUNTER — OFFICE VISIT (OUTPATIENT)
Dept: FAMILY MEDICINE CLINIC | Age: 74
End: 2019-10-16
Payer: MEDICARE

## 2019-10-16 VITALS
OXYGEN SATURATION: 100 % | TEMPERATURE: 96.1 F | SYSTOLIC BLOOD PRESSURE: 113 MMHG | DIASTOLIC BLOOD PRESSURE: 64 MMHG | BODY MASS INDEX: 22.66 KG/M2 | WEIGHT: 120 LBS | HEART RATE: 65 BPM | RESPIRATION RATE: 12 BRPM | HEIGHT: 61 IN

## 2019-10-16 DIAGNOSIS — Z23 NEED FOR SHINGLES VACCINE: ICD-10-CM

## 2019-10-16 DIAGNOSIS — G56.01 CARPAL TUNNEL SYNDROME OF RIGHT WRIST: ICD-10-CM

## 2019-10-16 DIAGNOSIS — Z01.818 PRE-OP EXAM: Primary | ICD-10-CM

## 2019-10-16 DIAGNOSIS — E02 SUBCLINICAL IODINE-DEFICIENCY HYPOTHYROIDISM: ICD-10-CM

## 2019-10-16 DIAGNOSIS — M65.331 TRIGGER MIDDLE FINGER OF RIGHT HAND: ICD-10-CM

## 2019-10-16 DIAGNOSIS — I25.10 CORONARY ARTERY DISEASE INVOLVING NATIVE HEART WITHOUT ANGINA PECTORIS, UNSPECIFIED VESSEL OR LESION TYPE: ICD-10-CM

## 2019-10-16 DIAGNOSIS — E78.00 PURE HYPERCHOLESTEROLEMIA: ICD-10-CM

## 2019-10-16 DIAGNOSIS — Z23 NEED FOR INFLUENZA VACCINATION: ICD-10-CM

## 2019-10-16 DIAGNOSIS — M17.11 PRIMARY OSTEOARTHRITIS OF RIGHT KNEE: ICD-10-CM

## 2019-10-16 DIAGNOSIS — M51.36 DDD (DEGENERATIVE DISC DISEASE), LUMBAR: ICD-10-CM

## 2019-10-16 PROCEDURE — G8420 CALC BMI NORM PARAMETERS: HCPCS | Performed by: FAMILY MEDICINE

## 2019-10-16 PROCEDURE — 1090F PRES/ABSN URINE INCON ASSESS: CPT | Performed by: FAMILY MEDICINE

## 2019-10-16 PROCEDURE — 93000 ELECTROCARDIOGRAM COMPLETE: CPT | Performed by: FAMILY MEDICINE

## 2019-10-16 PROCEDURE — 99213 OFFICE O/P EST LOW 20 MIN: CPT | Performed by: FAMILY MEDICINE

## 2019-10-16 PROCEDURE — G8482 FLU IMMUNIZE ORDER/ADMIN: HCPCS | Performed by: FAMILY MEDICINE

## 2019-10-16 PROCEDURE — 90662 IIV NO PRSV INCREASED AG IM: CPT | Performed by: FAMILY MEDICINE

## 2019-10-16 PROCEDURE — G0008 ADMIN INFLUENZA VIRUS VAC: HCPCS | Performed by: FAMILY MEDICINE

## 2019-10-16 PROCEDURE — G8427 DOCREV CUR MEDS BY ELIG CLIN: HCPCS | Performed by: FAMILY MEDICINE

## 2019-10-18 ENCOUNTER — NURSE ONLY (OUTPATIENT)
Dept: FAMILY MEDICINE CLINIC | Age: 74
End: 2019-10-18
Payer: MEDICARE

## 2019-10-18 DIAGNOSIS — Z23 NEED FOR SHINGLES VACCINE: ICD-10-CM

## 2019-10-18 DIAGNOSIS — Z23 NEED FOR VARICELLA VACCINE: Primary | ICD-10-CM

## 2019-10-18 PROCEDURE — 90471 IMMUNIZATION ADMIN: CPT | Performed by: FAMILY MEDICINE

## 2019-10-18 PROCEDURE — 90750 HZV VACC RECOMBINANT IM: CPT | Performed by: FAMILY MEDICINE

## 2019-10-22 ENCOUNTER — TELEPHONE (OUTPATIENT)
Dept: ORTHOPEDIC SURGERY | Age: 74
End: 2019-10-22

## 2019-11-07 ENCOUNTER — OFFICE VISIT (OUTPATIENT)
Dept: ORTHOPEDIC SURGERY | Age: 74
End: 2019-11-07

## 2019-11-07 VITALS — BODY MASS INDEX: 26.81 KG/M2 | HEIGHT: 61 IN | WEIGHT: 141.98 LBS | RESPIRATION RATE: 16 BRPM

## 2019-11-07 DIAGNOSIS — M65.331 TRIGGER MIDDLE FINGER OF RIGHT HAND: ICD-10-CM

## 2019-11-07 DIAGNOSIS — G56.01 CARPAL TUNNEL SYNDROME OF RIGHT WRIST: ICD-10-CM

## 2019-11-07 PROCEDURE — 99024 POSTOP FOLLOW-UP VISIT: CPT | Performed by: ORTHOPAEDIC SURGERY

## 2019-11-11 ENCOUNTER — CLINICAL DOCUMENTATION (OUTPATIENT)
Dept: ORTHOPEDIC SURGERY | Age: 74
End: 2019-11-11

## 2020-01-14 ENCOUNTER — OFFICE VISIT (OUTPATIENT)
Dept: DERMATOLOGY | Age: 75
End: 2020-01-14
Payer: MEDICARE

## 2020-01-14 PROCEDURE — G8399 PT W/DXA RESULTS DOCUMENT: HCPCS | Performed by: DERMATOLOGY

## 2020-01-14 PROCEDURE — G8417 CALC BMI ABV UP PARAM F/U: HCPCS | Performed by: DERMATOLOGY

## 2020-01-14 PROCEDURE — 99213 OFFICE O/P EST LOW 20 MIN: CPT | Performed by: DERMATOLOGY

## 2020-01-14 PROCEDURE — 1036F TOBACCO NON-USER: CPT | Performed by: DERMATOLOGY

## 2020-01-14 PROCEDURE — G8482 FLU IMMUNIZE ORDER/ADMIN: HCPCS | Performed by: DERMATOLOGY

## 2020-01-14 PROCEDURE — 3017F COLORECTAL CA SCREEN DOC REV: CPT | Performed by: DERMATOLOGY

## 2020-01-14 PROCEDURE — 4040F PNEUMOC VAC/ADMIN/RCVD: CPT | Performed by: DERMATOLOGY

## 2020-01-14 PROCEDURE — G8427 DOCREV CUR MEDS BY ELIG CLIN: HCPCS | Performed by: DERMATOLOGY

## 2020-01-14 PROCEDURE — 1123F ACP DISCUSS/DSCN MKR DOCD: CPT | Performed by: DERMATOLOGY

## 2020-01-14 PROCEDURE — 1090F PRES/ABSN URINE INCON ASSESS: CPT | Performed by: DERMATOLOGY

## 2020-03-16 ENCOUNTER — OFFICE VISIT (OUTPATIENT)
Dept: ORTHOPEDIC SURGERY | Age: 75
End: 2020-03-16
Payer: MEDICARE

## 2020-03-16 VITALS — RESPIRATION RATE: 17 BRPM | BODY MASS INDEX: 26.81 KG/M2 | HEIGHT: 61 IN | WEIGHT: 141.98 LBS

## 2020-03-16 PROCEDURE — 99213 OFFICE O/P EST LOW 20 MIN: CPT | Performed by: ORTHOPAEDIC SURGERY

## 2020-03-16 PROCEDURE — 1036F TOBACCO NON-USER: CPT | Performed by: ORTHOPAEDIC SURGERY

## 2020-03-16 PROCEDURE — G8427 DOCREV CUR MEDS BY ELIG CLIN: HCPCS | Performed by: ORTHOPAEDIC SURGERY

## 2020-03-16 PROCEDURE — G8399 PT W/DXA RESULTS DOCUMENT: HCPCS | Performed by: ORTHOPAEDIC SURGERY

## 2020-03-16 PROCEDURE — 1123F ACP DISCUSS/DSCN MKR DOCD: CPT | Performed by: ORTHOPAEDIC SURGERY

## 2020-03-16 PROCEDURE — G8482 FLU IMMUNIZE ORDER/ADMIN: HCPCS | Performed by: ORTHOPAEDIC SURGERY

## 2020-03-16 PROCEDURE — 1090F PRES/ABSN URINE INCON ASSESS: CPT | Performed by: ORTHOPAEDIC SURGERY

## 2020-03-16 PROCEDURE — 4040F PNEUMOC VAC/ADMIN/RCVD: CPT | Performed by: ORTHOPAEDIC SURGERY

## 2020-03-16 PROCEDURE — G8417 CALC BMI ABV UP PARAM F/U: HCPCS | Performed by: ORTHOPAEDIC SURGERY

## 2020-03-16 NOTE — PROGRESS NOTES
Assessment: Dupuytren's nodules which have developed just proximal to her right middle trigger finger release. She has had complete relapses of the triggering and of her carpal tunnel symptoms but was a little concerned about the nodularity proximal to her scar and also the very proximal aspect of her carpal tunnel release incision    Treatment Plan: I reassured her that these nodules frequently form after surgery and rarely cause further contracture. We also discussed her [facility] arthritis and the operative indications for that at current time. I do not think she is quite uncomfortable enough to warrant surgery yet but she is probably going to eventually require [facility] arthroplasty on this right side    Return if symptoms worsen or fail to improve. History of Present Illness  Luz Elena Ragsdale is a 67 y.o. female. Location:  Right hand Severity: no pain Duration: several months  Modifying factors: she is not happy with the look of her scar from her right CTR 10/31/19, she has also noticed a lump in the palm of her right hand near where her right middle TFR incision was, she is worried it is a piece of bone Associated symptoms: none    Review of Systems  Pertinent items are noted in HPI  Denies fever chills, confusion and bowel and bladder active change  Complete Review of Systems reviewed from patient history form dated 8/26/19 and available in the patients chart under the media tab. Vital Signs  Vitals:    03/16/20 1345   Resp: 17   Weight: 141 lb 15.6 oz (64.4 kg)   Height: 5' 0.98\" (1.549 m)     Body mass index is 26.84 kg/m².      Contributory History  None    Medical History  Past Medical History:   Diagnosis Date    CAD (coronary artery disease) 11/12    per CT cardiac [id number]=208    Colon polyp 2004    Compression fracture of L2 ([address] 11/2017    DDD (degenerative disc disease), lumbar     L2-3; L3-4, L4-5, L5-S1    Hyperlipidemia     Hypothyroidism     Lateral meniscal tear 12/15    since quittin.2    Smokeless tobacco: Never Used    Tobacco comment: smoked sometimes   Substance and Sexual Activity    Alcohol use: Yes     Alcohol/week: 7.0 standard drinks     Types: 7 Glasses of wine per week    Drug use: No    Sexual activity: Yes     Partners: Male   Lifestyle    Physical activity     Days per week: Not on file     Minutes per session: Not on file    Stress: Not on file   Relationships    Social connections     Talks on phone: Not on file     Gets together: Not on file     Attends Church service: Not on file     Active member of club or organization: Not on file     Attends meetings of clubs or organizations: Not on file     Relationship status: Not on file    Intimate partner violence     Fear of current or ex partner: Not on file     Emotionally abused: Not on file     Physically abused: Not on file     Forced sexual activity: Not on file   Other Topics Concern    Not on file   Social History Narrative    Not on file     Family History   Problem Relation Age of Onset    Ovarian Cancer Mother     Colon Cancer Mother 71    Other Mother         brain tumor - ? CA    Cancer Mother         colon    Heart Disease Father     Diabetes Father     High Blood Pressure Father     Mult Sclerosis Sister     Arthritis Brother        Physical Exam  Constitutional: Normal nutritional status  Mental Status: Alert and oriented  Skin: No rashes or erythema  Lymphatic: No lymphadenopathy    Hand Examination: Thumb ballottement and thumb grind test are definitely positive on the right hand. She does have 2 small nodules in the palmar fascia just proximal to her trigger finger incision and I think these are early Dupuytren's nodules.     Complete resolution of her numbness and tingling after carpal tunnel release    Additional Comments:     Additional Examinations:  X-Ray Findings: PA lateral and oblique x-rays of the right hand were repeated today as she was concerned that these were bony masses in this area the MP joint is looks normal at the base of this middle finger. She does have grade 3 osteoarthritis of the first carpometacarpal joint  Additional Diagnostic Test Findings:    Office Procedures: Risks and benefits of Aia 16 arthroplasty were discussed with the patient today    Time Statement: This dictation was performed with a verbal recognition program. It is possible that there are still dictated errors within this office note. All efforts were made to ensure that this office note is accurate. Orders Placed This Encounter   Procedures    XR HAND RIGHT (MIN 3 VIEWS)     Standing Status:   Future     Number of Occurrences:   1     Standing Expiration Date:   3/16/2021       Attestation: I have reviewed the chief complaint and history of present illness (including ROS and PFSH) and vital documentation by my staff and I agree with their documentation and have added where applicable.

## 2020-05-12 RX ORDER — LEVOTHYROXINE SODIUM 0.07 MG/1
TABLET ORAL
Qty: 90 TABLET | Refills: 0 | Status: SHIPPED | OUTPATIENT
Start: 2020-05-12 | End: 2020-07-31 | Stop reason: SDUPTHER

## 2020-07-30 ENCOUNTER — PATIENT MESSAGE (OUTPATIENT)
Dept: FAMILY MEDICINE CLINIC | Age: 75
End: 2020-07-30

## 2020-07-31 ENCOUNTER — PATIENT MESSAGE (OUTPATIENT)
Dept: FAMILY MEDICINE CLINIC | Age: 75
End: 2020-07-31

## 2020-07-31 RX ORDER — LEVOTHYROXINE SODIUM 0.07 MG/1
TABLET ORAL
Qty: 30 TABLET | Refills: 0 | Status: SHIPPED | OUTPATIENT
Start: 2020-07-31 | End: 2021-05-25 | Stop reason: SDUPTHER

## 2020-07-31 NOTE — TELEPHONE ENCOUNTER
Last OV  10-16-19  Future OV  none   Last Labs  6-18-19    Pharmacy correct      Please advise - does patient need office visit or just labwork.

## 2020-07-31 NOTE — TELEPHONE ENCOUNTER
From: Flakito Ware  To: Glen Pride MD  Sent: 7/30/2020  6:52 PM EDT  Subject: Prescription Question    Please advise me regarding renewal for synthroid. My prescription requires a doctor visit but called today & told I need to talk to doctor. Im feeling fine & dont beIieve i need to see dr [facility] but not sure how to proceed.  Thank you

## 2020-08-04 ENCOUNTER — TELEPHONE (OUTPATIENT)
Dept: FAMILY MEDICINE CLINIC | Age: 75
End: 2020-08-04

## 2020-08-04 ENCOUNTER — TELEPHONE (OUTPATIENT)
Dept: ORTHOPEDIC SURGERY | Age: 75
End: 2020-08-04

## 2020-08-04 NOTE — TELEPHONE ENCOUNTER
GAVE [facility] COMPLETE MEDICAL RECORDS (PLETTNER) TO KATHRINE CUNNINGHAM FOR PATIENT .

## 2020-08-04 NOTE — TELEPHONE ENCOUNTER
----- Message from Jae Lundy sent at 7/30/2020  1:20 PM EDT -----  Subject: Appointment Request    Reason for Call: Routine (Patient Request) No Script    QUESTIONS  Type of Appointment? Established Patient  Reason for appointment request? No appointments available during search  Additional Information for Provider? Pt is requesting an appointment in   the office for her yearly check up   ---------------------------------------------------------------------------  --------------  [address]  What is the best way for the office to contact you? OK to leave message on   voicemail  Preferred Call Back Phone Number? [phone]  ---------------------------------------------------------------------------  --------------  SCRIPT ANSWERS  Relationship to Patient? Self  Appointment reason? Symptomatic  Select script based on patient symptoms? Adult No Script  (Patient requests to see the provider urgently  today or tomorrow. )? No  (Is the patient requesting to see the provider for a procedure?)? No  (Is the patient requesting to be seen routinely (not today or tomorrow)? Yes  Have you been diagnosed with   tested for   or told that you are suspected of having COVID-19 (Coronavirus)? No  Have you had a fever or taken medication to treat a fever within the past   3 days? No  Have you had a cough   shortness of breath or flu-like symptoms within the past 3 days? No  Do you currently have flu-like symptoms including fever or chills   cough   shortness of breath   or difficulty breathing   or new loss of taste or smell? No  (Service Expert  click yes below to proceed with [facility] As Usual   Scheduling)?  Yes

## 2020-08-04 NOTE — TELEPHONE ENCOUNTER
Patient advised that needs lab work done and also a follow up office visit. She might be having trigger finger surgery soon so will call to schedule preop it that happens.  Patient advised to call our office to have lab orders faxed if she does not want to go to a [facility] lab

## 2020-08-28 DIAGNOSIS — E78.00 PURE HYPERCHOLESTEROLEMIA: ICD-10-CM

## 2020-08-28 DIAGNOSIS — E02 SUBCLINICAL IODINE-DEFICIENCY HYPOTHYROIDISM: ICD-10-CM

## 2020-08-28 LAB
A/G RATIO: 1.9 (ref 1.1–2.2)
ALBUMIN SERPL-MCNC: 4.5 G/DL (ref 3.4–5)
ALP BLD-CCNC: 62 U/L (ref 40–129)
ALT SERPL-CCNC: 24 U/L (ref 10–40)
ANION GAP SERPL CALCULATED.3IONS-SCNC: 11 MMOL/L (ref 3–16)
AST SERPL-CCNC: 28 U/L (ref 15–37)
BILIRUB SERPL-MCNC: 0.6 MG/DL (ref 0–1)
BUN BLDV-MCNC: 14 MG/DL (ref 7–20)
CALCIUM SERPL-MCNC: 9.7 MG/DL (ref 8.3–10.6)
CHLORIDE BLD-SCNC: 99 MMOL/L (ref 99–110)
CHOLESTEROL, TOTAL: 178 MG/DL (ref 0–199)
CO2: 26 MMOL/L (ref 21–32)
CREAT SERPL-MCNC: 0.5 MG/DL (ref 0.6–1.2)
GFR AFRICAN AMERICAN: >60
GFR NON-AFRICAN AMERICAN: >60
GLOBULIN: 2.4 G/DL
GLUCOSE BLD-MCNC: 92 MG/DL (ref 70–99)
HDLC SERPL-MCNC: 63 MG/DL (ref 40–60)
LDL CHOLESTEROL CALCULATED: 97 MG/DL
POTASSIUM SERPL-SCNC: 4.5 MMOL/L (ref 3.5–5.1)
SODIUM BLD-SCNC: 136 MMOL/L (ref 136–145)
TOTAL PROTEIN: 6.9 G/DL (ref 6.4–8.2)
TRIGL SERPL-MCNC: 90 MG/DL (ref 0–150)
TSH REFLEX: 0.76 UIU/ML (ref 0.27–4.2)
VLDLC SERPL CALC-MCNC: 18 MG/DL

## 2020-08-31 ENCOUNTER — TELEPHONE (OUTPATIENT)
Dept: FAMILY MEDICINE CLINIC | Age: 75
End: 2020-08-31

## 2020-08-31 NOTE — TELEPHONE ENCOUNTER
----- Message from Manual Donta sent at 8/31/2020 11:52 AM EDT -----  Subject: Message to Provider    QUESTIONS  Information for Provider? Patient is asking to have a copy of her recent   lab faxed over to Dr. Pierre Likes he is her Cardiologist at [facility] and the fax # [phone]. Please call her back once this is   done.  ---------------------------------------------------------------------------  --------------  CALL BACK INFO  What is the best way for the office to contact you? OK to leave message on   voicemail  Preferred Call Back Phone Number? [phone]  ---------------------------------------------------------------------------  --------------  SCRIPT ANSWERS  Relationship to Patient?  Self

## 2020-09-05 PROBLEM — G56.02 CARPAL TUNNEL SYNDROME ON LEFT: Status: ACTIVE | Noted: 2020-09-01

## 2020-09-21 ENCOUNTER — OFFICE VISIT (OUTPATIENT)
Dept: FAMILY MEDICINE CLINIC | Age: 75
End: 2020-09-21
Payer: MEDICARE

## 2020-09-21 VITALS
BODY MASS INDEX: 22.73 KG/M2 | SYSTOLIC BLOOD PRESSURE: 138 MMHG | WEIGHT: 120.4 LBS | RESPIRATION RATE: 16 BRPM | TEMPERATURE: 98 F | DIASTOLIC BLOOD PRESSURE: 83 MMHG | HEART RATE: 59 BPM | OXYGEN SATURATION: 100 % | HEIGHT: 61 IN

## 2020-09-21 PROCEDURE — 1123F ACP DISCUSS/DSCN MKR DOCD: CPT | Performed by: FAMILY MEDICINE

## 2020-09-21 PROCEDURE — G0439 PPPS, SUBSEQ VISIT: HCPCS | Performed by: FAMILY MEDICINE

## 2020-09-21 PROCEDURE — 90662 IIV NO PRSV INCREASED AG IM: CPT | Performed by: FAMILY MEDICINE

## 2020-09-21 PROCEDURE — G0008 ADMIN INFLUENZA VIRUS VAC: HCPCS | Performed by: FAMILY MEDICINE

## 2020-09-21 PROCEDURE — 4040F PNEUMOC VAC/ADMIN/RCVD: CPT | Performed by: FAMILY MEDICINE

## 2020-09-21 RX ORDER — LEVOTHYROXINE SODIUM 0.07 MG/1
75 TABLET ORAL DAILY
Qty: 90 TABLET | Refills: 3 | Status: SHIPPED | OUTPATIENT
Start: 2020-09-21 | End: 2021-09-20 | Stop reason: SDUPTHER

## 2020-09-21 RX ORDER — LEVOTHYROXINE SODIUM 0.07 MG/1
75 TABLET ORAL DAILY
Qty: 90 TABLET | Refills: 0 | Status: CANCELLED | OUTPATIENT
Start: 2020-12-20

## 2020-09-21 ASSESSMENT — PATIENT HEALTH QUESTIONNAIRE - PHQ9
SUM OF ALL RESPONSES TO PHQ QUESTIONS 1-9: 0
2. FEELING DOWN, DEPRESSED OR HOPELESS: 0
SUM OF ALL RESPONSES TO PHQ QUESTIONS 1-9: 0
1. LITTLE INTEREST OR PLEASURE IN DOING THINGS: 0
SUM OF ALL RESPONSES TO PHQ9 QUESTIONS 1 & 2: 0

## 2020-09-21 NOTE — PATIENT INSTRUCTIONS
Personalized Preventive Plan for Clari Culver - 9/21/2020  Medicare offers a range of preventive health benefits. Some of the tests and screenings are paid in full while other may be subject to a deductible, co-insurance, and/or copay. Some of these benefits include a comprehensive review of your medical history including lifestyle, illnesses that may run in your family, and various assessments and screenings as appropriate. After reviewing your medical record and screening and assessments performed today your provider may have ordered immunizations, labs, imaging, and/or referrals for you. A list of these orders (if applicable) as well as your Preventive Care list are included within your After Visit Summary for your review. Other Preventive Recommendations:    · A preventive eye exam performed by an eye specialist is recommended every 1-2 years to screen for glaucoma; cataracts, macular degeneration, and other eye disorders. · A preventive dental visit is recommended every 6 months. · Try to get at least 150 minutes of exercise per week or 10,000 steps per day on a pedometer . · Order or download the FREE \"Exercise & Physical Activity: Your Everyday Guide\" from [employer]. Call 1-811.499.1297 or search [employer] online. · You need 7195-7006 mg of calcium and 5156-8792 IU of vitamin D per day. It is possible to meet your calcium requirement with diet alone, but a vitamin D supplement is usually necessary to meet this goal.  · When exposed to the sun, use a sunscreen that protects against both UVA and UVB radiation with an SPF of 30 or greater. Reapply every 2 to 3 hours or after sweating, drying off with a towel, or swimming. · Always wear a seat belt when traveling in a car. Always wear a helmet when riding a bicycle or motorcycle.

## 2020-09-21 NOTE — PROGRESS NOTES
Medicare Annual Wellness Visit  Name: Jacinta Krueger Date: 2020   MRN: [id number] Sex: Female   Age: 67 y.o. Ethnicity: Non-/Non    : 1945 Race: Rusty Freed is here for Medicare AWV (pt is getting flu shot high dose -a.bowling) and Medication Check (w/refils synthroid)    Screenings for behavioral, psychosocial and functional/safety risks, and cognitive dysfunction are all negative except as indicated below. These results, as well as other patient data from the [address] form, are documented in Flowsheets linked to this Encounter. She has trigger finger right fourth . She went to Dr. Radha Ken for carpal tunnel surgery and then Dr. Kwabena Turner for trigger finger. She initially did not want cortisone injection then 1 month ago. Allergies   Allergen Reactions    Bactrim Ds [Sulfamethoxazole-Trimethoprim] Other (See Comments)     Very bad headache       Prior to Visit Medications    Medication Sig Taking? Authorizing Provider   levothyroxine (SYNTHROID) 75 MCG tablet TAKE ONE TABLET BY MOUTH DAILY. **MUST CALL MD FOR APPOINTMENT Yes Irene Russ MD   Ascorbic Acid (VITAMIN C) 500 MG tablet Take 1,000 mg by mouth daily. Yes Historical Provider, MD   MULTIPLE VITAMIN PO Take  by mouth. Yes Historical Provider, MD   Coenzyme Q-10 100 MG CAPS Take  by mouth. Yes Historical Provider, MD   valACYclovir (VALTREX) 1 g tablet 2 pills po X 1 dose then bit X 6 days additional.  Patient not taking: Reported on 2020  Chase Garland MD   mometasone (ELOCON) 0.1 % cream Apply topically daily. Patient not taking: Reported on 2020  Tammy Griffith APRN - CNP   atorvastatin (LIPITOR) 20 MG tablet Take 20 mg by mouth daily  Historical Provider, MD   Calcium-Vitamin D 600-200 MG-UNIT TABS Take  by mouth 2 times daily. Historical Provider, MD   OMEGA 3 1000 MG CAPS Take  by mouth daily.   Historical Provider, MD       Past Medical History: movements intact, conjunctivae normal  ENT: tympanic membrane, external ear and ear canal normal bilaterally, nose without deformity, nasal mucosa and turbinates normal without polyps  Neck: supple and non-tender without mass, no thyromegaly or thyroid nodules, no cervical lymphadenopathy  Pulmonary/Chest: clear to auscultation bilaterally- no wheezes, rales or rhonchi, normal air movement, no respiratory distress  Cardiovascular: normal rate, regular rhythm, normal S1 and S2, no murmurs, rubs, clicks, or gallops, distal pulses intact, no carotid bruits  Abdomen: soft, non-tender, non-distended, normal bowel sounds, no masses or organomegaly  Extremities: no cyanosis, clubbing or edema  Musculoskeletal: normal range of motion, no joint swelling, deformity or tenderness  Neurologic: reflexes normal and symmetric, no cranial nerve deficit, gait, coordination and speech normal    Patient's complete Health Risk Assessment and screening values have been reviewed and are found in Flowsheets. The following problems were reviewed today and where indicated follow up appointments were made and/or referrals ordered. Positive Risk Factor Screenings with Interventions:     No Positive Risk Factors identified today.     Personalized Preventive Plan   Current Health Maintenance Status  Immunization History   Administered Date(s) Administered    Influenza Whole 12/30/2009, 11/29/2010    Influenza, High Dose (Fluzone 65 yrs and older) 09/28/2011, 10/17/2012, 10/10/2013, 08/28/2014, 11/01/2015, 10/14/2016, 09/01/2017, 10/24/2018, 10/16/2019    Influenza, High-dose, Quadv, 65 yrs +, IM (Fluzone) 09/21/2020    Pneumococcal Conjugate 13-valent ([id number]) 12/03/2015    Pneumococcal Polysaccharide ([id number]) 08/02/2012    Tdap (Boostrix, Adacel) 11/13/2007    Zoster Live (Zostavax) 08/02/2012    Zoster Recombinant (Shingrix) 10/18/2019, 10/31/2019, 01/06/2020        Health Maintenance   Topic Date Due    DTaP/Tdap/Td vaccine (2 - Td) 11/13/2017    Annual Wellness Visit (AWV)  05/31/2019    Lipid screen  08/28/2021    TSH testing  08/28/2021    Flu vaccine  Completed    Shingles Vaccine  Completed    Pneumococcal 65+ years Vaccine  Completed    DEXA (modify frequency per FRAX score)  Addressed    Hepatitis A vaccine  Aged Out    Hepatitis B vaccine  Aged Out    Hib vaccine  Aged Out    Meningococcal (ACWY) vaccine  Aged Out     Recommendations for [employer] Due: see orders and patient instructions/AVS.  . Recommended screening schedule for the next 5-10 years is provided to the patient in written form: see Patient Instructions/AVS.    Nicolas Quintana was seen today for medicare awv and medication check. Diagnoses and all orders for this visit:    Routine general medical examination at a health care facility       -    Reviewed prior labs (CBC, complete metabolic panel, lipid)  Hypothyroidism, unspecified type  -     levothyroxine (SYNTHROID) 75 MCG tablet; Take 1 tablet by mouth Daily  -     Stable. Menopause  -     DEXA BONE DENSITY AXIAL SKELETON; Future and follow up after completed/ prn.   -     Continue calcium, vitamin D and weight bearing exercise. Trigger ring finger of right hand        -     Discussed hand specialist referral - options discussed, including prior hand surgeons she has seen. Needs flu shot  -     INFLUENZA, HIGH-DOSE, QUADV, 65 YRS +, IM, PF, 0.7ML (FLUZONE)    Follow up 6 - 12 months/ prn.

## 2020-09-21 NOTE — PROGRESS NOTES
Current Influenza vaccine VIS given to patient. Influenza consent form/questionnaire completed and signed. Patient responses to  Influenza consent form / questionnaire  reviewed. Vaccine given per protocol. Vaccine Information Sheet, \"Influenza - Inactivated\"  given to Heide Lantigua, or parent/legal guardian of  Heide Lantigua and verbalized understanding. Patient responses:    Have you ever had a reaction to a flu vaccine? No  Do you have any current illness? No  Have you ever had Guillian Calder Syndrome? No  Do you have a serious allergy to any of the follow: Neomycin, Polymyxin, Thimerosal, eggs or egg products? No    Flu vaccine given per order. Please see immunization tab. A.bowling  Risks and benefits explained. Current VIS given.

## 2020-11-11 ENCOUNTER — PATIENT MESSAGE (OUTPATIENT)
Dept: FAMILY MEDICINE CLINIC | Age: 75
End: 2020-11-11

## 2020-11-11 NOTE — TELEPHONE ENCOUNTER
From: Catina Garcia  To: Reynaldo Wallace MD  Sent: 11/11/2020 10:55 AM EST  Subject: Visit Follow-Up Question    Dr Reyes Nearing gave me an order for a dexa bone density scan. [facility] requests the order come from your office. The fax number is [phone]. Thank you.

## 2020-12-01 ENCOUNTER — PATIENT MESSAGE (OUTPATIENT)
Dept: FAMILY MEDICINE CLINIC | Age: 75
End: 2020-12-01

## 2020-12-01 NOTE — TELEPHONE ENCOUNTER
From: Crys Chatterjee  To: Mohit Sibley MD  Sent: 12/1/2020 10:18 AM EST  Subject: Non-Urgent Medical Question    Im experiencing diarrhea off & on, starting back in October. Im not sure if I should see a gastroenterologist or make an appt with your office. My concern of course is colon cancer.

## 2020-12-09 ENCOUNTER — HOSPITAL ENCOUNTER (OUTPATIENT)
Dept: MAMMOGRAPHY | Age: 75
Discharge: HOME | End: 2020-12-09
Payer: MEDICARE

## 2020-12-09 PROCEDURE — 77063 BREAST TOMOSYNTHESIS BI: CPT

## 2021-01-05 ENCOUNTER — OFFICE VISIT (OUTPATIENT)
Dept: DERMATOLOGY | Age: 76
End: 2021-01-05
Payer: MEDICARE

## 2021-01-05 VITALS — TEMPERATURE: 97.2 F

## 2021-01-05 DIAGNOSIS — K62.89 PERIANAL PAIN: ICD-10-CM

## 2021-01-05 DIAGNOSIS — L81.4 LENTIGINES: ICD-10-CM

## 2021-01-05 DIAGNOSIS — L82.1 SEBORRHEIC KERATOSIS: ICD-10-CM

## 2021-01-05 DIAGNOSIS — Z87.2 HISTORY OF ACTINIC KERATOSES: ICD-10-CM

## 2021-01-05 DIAGNOSIS — D22.9 MULTIPLE NEVI: Primary | ICD-10-CM

## 2021-01-05 PROCEDURE — 99214 OFFICE O/P EST MOD 30 MIN: CPT | Performed by: DERMATOLOGY

## 2021-01-05 PROCEDURE — 1090F PRES/ABSN URINE INCON ASSESS: CPT | Performed by: DERMATOLOGY

## 2021-01-05 PROCEDURE — G8399 PT W/DXA RESULTS DOCUMENT: HCPCS | Performed by: DERMATOLOGY

## 2021-01-05 PROCEDURE — 1036F TOBACCO NON-USER: CPT | Performed by: DERMATOLOGY

## 2021-01-05 PROCEDURE — G8420 CALC BMI NORM PARAMETERS: HCPCS | Performed by: DERMATOLOGY

## 2021-01-05 PROCEDURE — 1123F ACP DISCUSS/DSCN MKR DOCD: CPT | Performed by: DERMATOLOGY

## 2021-01-05 PROCEDURE — G8427 DOCREV CUR MEDS BY ELIG CLIN: HCPCS | Performed by: DERMATOLOGY

## 2021-01-05 PROCEDURE — G8484 FLU IMMUNIZE NO ADMIN: HCPCS | Performed by: DERMATOLOGY

## 2021-01-05 PROCEDURE — 4040F PNEUMOC VAC/ADMIN/RCVD: CPT | Performed by: DERMATOLOGY

## 2021-01-05 NOTE — PROGRESS NOTES
[facility]  Harriet Chaidez MD  [address]  1945    67 y.o. female     Date of Visit: 1/5/2021    Chief Complaint: moles, lesions  Chief Complaint   Patient presents with    Skin Exam     Last seen: 1-2020    History of Present Illness:    1. Here for evaluation of multiple asx pigmented lesions on the trunk and extremities, present for many years; no change in size/shape/color of any lesions; no bleeding lesions. 2. Scattered brown spots on the face - had one large lentigo removed with laser in the past and has multiple scattered small lesions that were treated with LN2 and VI peel in 3-2019 with significant improvement. She denies other changing moles/lesions. 3. F/u AK - previously on the R cheek. No new concerns. 4.  C/o perianal pain and occasional small amount of blood with straining with BM's, particularly harder stools. She reports she has had colonoscopy in the past per screening recommendations and had hemorrhoids in the past.  Has tried OTC trx. Hx of eruption on the upper lip x few mos. Started with tight feeling and then peeling followed by Papules, ? Blister, crusts and peeling patch on the central/L upper lip. Took valtrex for ? HSV. Also used mometasone with clearance. Favored contact dermatitis over HSV. No flares since. Previously discussed:  Asx telangiectasias on both cheeks for several yrs; had 1 trx with Vbeam laser in past with focal improvement. 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 Review of Systems:  Gen: Feels well, good sense of health. Skin: No changing moles or lesions. Past Medical History, Family History, Surgical History, Medications and Allergies reviewed.     Past Medical History:   Diagnosis Date    CAD (coronary artery disease) 11/12    per CT cardiac [id number]    Carpal tunnel syndrome on left 09/2020    Colon polyp 2004    Compression fracture of L2 ([address] 11/2017    DDD (degenerative disc disease), lumbar     L2-3; L3-4, L4-5, L5-S1    Hyperlipidemia     Hypothyroidism     Lateral meniscal tear 12/15    Osteoarthritis of right knee 12/15    Osteopenia     per DEXA       Past Surgical History:   Procedure Laterality Date    CARPAL TUNNEL RELEASE  11/2019    COLONOSCOPY      KNEE ARTHROSCOPY Right 3/31/2016    RIGHT KNEE ARTHROSCOPY WITH PARTIAL LATERAL MENISCECTOMY    THYROID SURGERY  3/13    right- biopsy       Outpatient Medications Marked as Taking for the 1/5/21 encounter (Office Visit) with Melani Marks MD   Medication Sig Dispense Refill    levothyroxine (SYNTHROID) 75 MCG tablet Take 1 tablet by mouth Daily 90 tablet 3    levothyroxine (SYNTHROID) 75 MCG tablet TAKE ONE TABLET BY MOUTH DAILY. **MUST CALL MD FOR APPOINTMENT 30 tablet 0    valACYclovir (VALTREX) 1 g tablet 2 pills po X 1 dose then bit X 6 days additional. 14 tablet 1    mometasone (ELOCON) 0.1 % cream Apply topically daily. 1 Tube 3    atorvastatin (LIPITOR) 20 MG tablet Take 20 mg by mouth daily      Ascorbic Acid (VITAMIN C) 500 MG tablet Take 1,000 mg by mouth daily.  Calcium-Vitamin D 600-200 MG-UNIT TABS Take  by mouth 2 times daily.  MULTIPLE VITAMIN PO Take  by mouth.  OMEGA 3 1000 MG CAPS Take  by mouth daily.  Coenzyme Q-10 100 MG CAPS Take  by mouth. Allergies   Allergen Reactions    Bactrim Ds [Sulfamethoxazole-Trimethoprim] Other (See Comments)     Very bad headache       Physical Examination     Gen, NAD  The following were examined and determined to be normal: Psych/Neuro, Scalp/hair, Conjunctivae/eyelids, Neck, Breast/axilla/chest, Abdomen, Back, RUE, LUE, RLE, LLE, Nails/digits and buttocks. face. , lips  The following were examined and determined to be abnormal: none    trunk and extremities with scattered brown macules and papules   R perinasal area with telangiectatic 3-4 mm blanchable macule - stable  Cheeks and nose with few telangiectasias  Face with scattered tan and light brown macules - many faded/cleared with peel  No AK's  Lips/perioral clear  Perianal area overall clear with external exam            Assessment and Plan     1. Benign-appearing nevi and SK's  - educ re ABCD's of MM   educ sun protection   encouraged skin check yearly (sooner if indicated), self checks     2. Lentigines and few SK's  -Encouraged sun protection with OTC SPF 30-50+ and OK to cont tretinoin cream  - laser and IPL versus peels - recommended return for treatment in the fall/winter instead of the summer if she desires treatment  - Rtn for VI peels if desired - 250 - would do with focal LN2 as needed for the face    3. AK - no new lesions today  - sun protection    4. Perianal pain with BM's - ? Hemorrhoids but discussed that she needs to have eval by appropriate provider - PCP or GI if continued sx  - mometasone bid x 1 week   - if no improvement, f/u with PCP or GI for eval and trx    F/u 1 year.     She saw Dr. Chris Bob for Sculptra in the past.

## 2021-01-06 DIAGNOSIS — Z78.0 MENOPAUSE: ICD-10-CM

## 2021-05-25 ENCOUNTER — PATIENT MESSAGE (OUTPATIENT)
Dept: FAMILY MEDICINE CLINIC | Age: 76
End: 2021-05-25

## 2021-05-25 DIAGNOSIS — E03.9 HYPOTHYROIDISM, UNSPECIFIED TYPE: ICD-10-CM

## 2021-05-25 RX ORDER — LEVOTHYROXINE SODIUM 0.07 MG/1
TABLET ORAL
Qty: 8 TABLET | Refills: 0 | Status: SHIPPED | OUTPATIENT
Start: 2021-05-25 | End: 2022-05-24

## 2021-08-19 ENCOUNTER — TELEPHONE (OUTPATIENT)
Dept: FAMILY MEDICINE CLINIC | Age: 76
End: 2021-08-19

## 2021-08-19 ENCOUNTER — VIRTUAL VISIT (OUTPATIENT)
Dept: FAMILY MEDICINE CLINIC | Age: 76
End: 2021-08-19
Payer: MEDICARE

## 2021-08-19 DIAGNOSIS — J01.00 ACUTE MAXILLARY SINUSITIS, RECURRENCE NOT SPECIFIED: Primary | ICD-10-CM

## 2021-08-19 PROCEDURE — G8427 DOCREV CUR MEDS BY ELIG CLIN: HCPCS | Performed by: FAMILY MEDICINE

## 2021-08-19 PROCEDURE — 99213 OFFICE O/P EST LOW 20 MIN: CPT | Performed by: FAMILY MEDICINE

## 2021-08-19 PROCEDURE — 1123F ACP DISCUSS/DSCN MKR DOCD: CPT | Performed by: FAMILY MEDICINE

## 2021-08-19 PROCEDURE — 4040F PNEUMOC VAC/ADMIN/RCVD: CPT | Performed by: FAMILY MEDICINE

## 2021-08-19 PROCEDURE — G8399 PT W/DXA RESULTS DOCUMENT: HCPCS | Performed by: FAMILY MEDICINE

## 2021-08-19 PROCEDURE — 1090F PRES/ABSN URINE INCON ASSESS: CPT | Performed by: FAMILY MEDICINE

## 2021-08-19 RX ORDER — AMOXICILLIN AND CLAVULANATE POTASSIUM 875; 125 MG/1; MG/1
1 TABLET, FILM COATED ORAL 2 TIMES DAILY
Qty: 20 TABLET | Refills: 0 | Status: SHIPPED | OUTPATIENT
Start: 2021-08-19 | End: 2021-08-29

## 2021-08-19 SDOH — ECONOMIC STABILITY: TRANSPORTATION INSECURITY
IN THE PAST 12 MONTHS, HAS LACK OF TRANSPORTATION KEPT YOU FROM MEETINGS, WORK, OR FROM GETTING THINGS NEEDED FOR DAILY LIVING?: NO

## 2021-08-19 SDOH — ECONOMIC STABILITY: FOOD INSECURITY: WITHIN THE PAST 12 MONTHS, YOU WORRIED THAT YOUR FOOD WOULD RUN OUT BEFORE YOU GOT MONEY TO BUY MORE.: NEVER TRUE

## 2021-08-19 SDOH — ECONOMIC STABILITY: TRANSPORTATION INSECURITY
IN THE PAST 12 MONTHS, HAS THE LACK OF TRANSPORTATION KEPT YOU FROM MEDICAL APPOINTMENTS OR FROM GETTING MEDICATIONS?: NO

## 2021-08-19 SDOH — ECONOMIC STABILITY: FOOD INSECURITY: WITHIN THE PAST 12 MONTHS, THE FOOD YOU BOUGHT JUST DIDN'T LAST AND YOU DIDN'T HAVE MONEY TO GET MORE.: NEVER TRUE

## 2021-08-19 ASSESSMENT — PATIENT HEALTH QUESTIONNAIRE - PHQ9
1. LITTLE INTEREST OR PLEASURE IN DOING THINGS: 0
SUM OF ALL RESPONSES TO PHQ QUESTIONS 1-9: 0
2. FEELING DOWN, DEPRESSED OR HOPELESS: 0
SUM OF ALL RESPONSES TO PHQ QUESTIONS 1-9: 0
SUM OF ALL RESPONSES TO PHQ9 QUESTIONS 1 & 2: 0
SUM OF ALL RESPONSES TO PHQ QUESTIONS 1-9: 0

## 2021-08-19 ASSESSMENT — SOCIAL DETERMINANTS OF HEALTH (SDOH): HOW HARD IS IT FOR YOU TO PAY FOR THE VERY BASICS LIKE FOOD, HOUSING, MEDICAL CARE, AND HEATING?: NOT HARD AT ALL

## 2021-08-19 NOTE — TELEPHONE ENCOUNTER
Patient requesting for something to be called in for her sinus infection. Patient states she's had a sinus infection since last Saturday; post nasal drip, no fever. Please call patient either way. Please advise. Thanks.

## 2021-08-19 NOTE — PROGRESS NOTES
2021    TELEHEALTH EVALUATION -- Audio/Visual (During ASZSL-92 public health emergency)    Due to Sherron 19 outbreak, patient's office visit was converted to a virtual visit. Patient was contacted and agreed to proceed with a virtual visit via Doxy. me  The risks and benefits of converting to a virtual visit were discussed in light of the current infectious disease epidemic. Patient also understood that insurance coverage and co-pays are up to their individual insurance plans. HPI:    Desiree Hinds (:  1945) has requested an audio/video evaluation for the following concern(s):    URI symptoms X 1 week. Started with laryngitis and congestion X 1 week. Nasal discharge is yellow. Tm =99.1 . Cough is productive of yellow sputum. No shortness of breath or wheezing. Sleeping difficulties at times at night due to post nasal drip and cough. Napped today . Earache on left. Some left sided facial pain . Headaches off and on. No loss of taste or smell. No sore throat . She had COVID vaccine - Moderna both doses in [place]. Review of Systems    Prior to Visit Medications    Medication Sig Taking? Authorizing Provider   levothyroxine (SYNTHROID) 75 MCG tablet Take 1 tablet by mouth Daily Yes Neo Patel MD   atorvastatin (LIPITOR) 20 MG tablet Take 20 mg by mouth daily Yes Historical Provider, MD   Ascorbic Acid (VITAMIN C) 500 MG tablet Take 1,000 mg by mouth daily. Yes Historical Provider, MD   MULTIPLE VITAMIN PO Take  by mouth. Yes Historical Provider, MD   Coenzyme Q-10 100 MG CAPS Take  by mouth. Yes Historical Provider, MD   levothyroxine (SYNTHROID) 75 MCG tablet TAKE ONE TABLET BY MOUTH DAILY. **MUST CALL MD FOR APPOINTMENT  Neo Patel MD   valACYclovir (VALTREX) 1 g tablet 2 pills po X 1 dose then bit X 6 days additional.  Neo Patel MD   mometasone (ELOCON) 0.1 % cream Apply topically daily.   Johann Amber, APRN - CNP   Calcium-Vitamin D 600-200 MG-UNIT TABS Take  by mouth 2 times daily. Patient not taking: Reported on 2021  Historical Provider, MD   OMEGA 3 1000 MG CAPS Take  by mouth daily. Historical Provider, MD       Allergies   Allergen Reactions    Bactrim Ds [Sulfamethoxazole-Trimethoprim] Other (See Comments)     Very bad headache       Social History     Tobacco Use    Smoking status: Former Smoker     Packs/day: 0.05     Years: 14.00     Pack years: 0.70     Types: Cigarettes     Quit date: 1976     Years since quittin.6    Smokeless tobacco: Never Used    Tobacco comment: smoked sometimes   Substance Use Topics    Alcohol use:  Yes     Alcohol/week: 7.0 standard drinks     Types: 7 Glasses of wine per week    Drug use: No        Past Medical History:   Diagnosis Date    CAD (coronary artery disease)     per CT cardiac [id number]    Carpal tunnel syndrome on left 2020    Colon polyp 2004    Compression fracture of L2 ([address] 2017    DDD (degenerative disc disease), lumbar     L2-3; L3-4, L4-5, L5-S1    Hyperlipidemia     Hypothyroidism     Lateral meniscal tear 12/15    Osteoarthritis of right knee 12/15    Osteopenia     per DEXA       Past Surgical History:   Procedure Laterality Date    CARPAL TUNNEL RELEASE  2019    COLONOSCOPY      KNEE ARTHROSCOPY Right 3/31/2016    RIGHT KNEE ARTHROSCOPY WITH PARTIAL LATERAL MENISCECTOMY    THYROID SURGERY  3/13    right- biopsy       Health Maintenance   Topic Date Due    Hepatitis C screen  Never done    COVID-19 Vaccine (1) Never done    DTaP/Tdap/Td vaccine (2 - Td or Tdap) 2017    Lipid screen  2021    TSH testing  2021    Flu vaccine (1) 2021    Annual Wellness Visit (AWV)  2021    DEXA (modify frequency per FRAX score)  Completed    Shingles Vaccine  Completed    Pneumococcal 65+ years Vaccine  Completed    Hepatitis A vaccine  Aged Out    Hepatitis B vaccine  Aged Out    Hib vaccine  Aged Out    Meningococcal (ACWY) vaccine  Aged Out       Family History   Problem Relation Age of Onset    Ovarian Cancer Mother     Colon Cancer Mother 71    Other Mother         brain tumor - ? CA    Cancer Mother         colon    Heart Disease Father     Diabetes Father     High Blood Pressure Father     Mult Sclerosis Sister     Arthritis Brother        PHYSICAL EXAMINATION:    Vital Signs: (As obtained by patient/caregiver or practitioner observation)     Patient-Reported Vitals 8/19/2021   Patient-Reported Weight 118   Patient-Reported Temperature 99.1       Heart rate= 80   Respiratory rate= 14     Constitutional:  Appears well-developed and well-nourished. No apparent distress                              Mental status:  Alert and awake. Oriented to person/place/time. Able to follow commands       Eyes: EOM intact. Sclera-normal. No erythema of conjunctiva. No eye discharge. HENT: Normocephalic, atraumatic. Mouth/Throat: normal. Mucous membranes are moist. Mild left maxillary tenderness. External Ears: Normal       Neck: No visualized mass      Pulmonary/Chest:  Respiratory effort normal.  No visualized signs of difficulty breathing or respiratory distress         Musculoskeletal:   Normal gait with no signs of ataxia. Normal range of motion of neck. Neurological:         No Facial Asymmetry (Cranial nerve 7 motor function) (limited exam to video visit) . No gaze palsy              Skin:                     No significant exanthematous lesions or discoloration noted on facial skin                                        Psychiatric:          Normal Affect. No Hallucinations           Other pertinent observable physical exam findings:       ASSESSMENT/PLAN:  1. Acute maxillary sinusitis, recurrence not specified  - Push fluids - water . Netti po prn.    - amoxicillin-clavulanate (AUGMENTIN) 875-125 MG per tablet; Take 1 tablet by mouth 2 times daily for 10 days  Dispense: 20 tablet; Refill: 0  - Not likely COVID.   Hold on testing. F/u if no improvement 7-10d/ prn increased symptoms. The time that was spent with the family/patient addressing care on this video call was 15 minutes. An  electronic signature was used to authenticate this note. --Kvng Reyes MD on 8/19/2021 at 5:18 PM      Pursuant to the emergency declaration under the [address] [place] [address] authority and the [employer] and [employer] Act, this Virtual  Visit was conducted, with patient's consent, to reduce the patient's risk of exposure to COVID-19 and provide continuity of care for an established patient. Services were provided through a video synchronous discussion virtually to substitute for in-person clinic visit.

## 2021-08-19 NOTE — TELEPHONE ENCOUNTER
Pt tried to do evisit and was requested to contact office due to sx. Complaining of ears being blocked and having a hard time hearing at times, draining, yellow mucus, cant talk well, left ear has started to hurt.  Please advise  Thank you

## 2021-08-20 ENCOUNTER — PATIENT MESSAGE (OUTPATIENT)
Dept: FAMILY MEDICINE CLINIC | Age: 76
End: 2021-08-20

## 2021-08-20 NOTE — TELEPHONE ENCOUNTER
From: Yamil Nixon  To: Shannon Dia MD  Sent: 8/20/2021 10:30 AM EDT  Subject: Test Results Question    Submitting a copy of my COVID-19 vaccination for your records

## 2021-09-06 ENCOUNTER — TELEPHONE (OUTPATIENT)
Dept: FAMILY MEDICINE CLINIC | Age: 76
End: 2021-09-06

## 2021-09-06 RX ORDER — NITROFURANTOIN 25; 75 MG/1; MG/1
100 CAPSULE ORAL 2 TIMES DAILY
Qty: 10 CAPSULE | Refills: 0 | Status: SHIPPED | OUTPATIENT
Start: 2021-09-06 | End: 2021-09-11

## 2021-09-20 ENCOUNTER — OFFICE VISIT (OUTPATIENT)
Dept: FAMILY MEDICINE CLINIC | Age: 76
End: 2021-09-20
Payer: MEDICARE

## 2021-09-20 VITALS
WEIGHT: 118.8 LBS | DIASTOLIC BLOOD PRESSURE: 70 MMHG | OXYGEN SATURATION: 98 % | HEART RATE: 70 BPM | SYSTOLIC BLOOD PRESSURE: 100 MMHG | BODY MASS INDEX: 22.45 KG/M2 | RESPIRATION RATE: 16 BRPM | TEMPERATURE: 97 F

## 2021-09-20 DIAGNOSIS — E03.9 HYPOTHYROIDISM, UNSPECIFIED TYPE: Primary | ICD-10-CM

## 2021-09-20 DIAGNOSIS — Z23 NEEDS FLU SHOT: ICD-10-CM

## 2021-09-20 DIAGNOSIS — I25.10 CORONARY ARTERY DISEASE INVOLVING NATIVE HEART WITHOUT ANGINA PECTORIS, UNSPECIFIED VESSEL OR LESION TYPE: ICD-10-CM

## 2021-09-20 DIAGNOSIS — Z11.59 NEED FOR HEPATITIS C SCREENING TEST: ICD-10-CM

## 2021-09-20 DIAGNOSIS — E78.00 PURE HYPERCHOLESTEROLEMIA: ICD-10-CM

## 2021-09-20 PROCEDURE — G0008 ADMIN INFLUENZA VIRUS VAC: HCPCS | Performed by: FAMILY MEDICINE

## 2021-09-20 PROCEDURE — 90694 VACC AIIV4 NO PRSRV 0.5ML IM: CPT | Performed by: FAMILY MEDICINE

## 2021-09-20 PROCEDURE — 1090F PRES/ABSN URINE INCON ASSESS: CPT | Performed by: FAMILY MEDICINE

## 2021-09-20 PROCEDURE — 1036F TOBACCO NON-USER: CPT | Performed by: FAMILY MEDICINE

## 2021-09-20 PROCEDURE — 99214 OFFICE O/P EST MOD 30 MIN: CPT | Performed by: FAMILY MEDICINE

## 2021-09-20 PROCEDURE — 1123F ACP DISCUSS/DSCN MKR DOCD: CPT | Performed by: FAMILY MEDICINE

## 2021-09-20 PROCEDURE — G8427 DOCREV CUR MEDS BY ELIG CLIN: HCPCS | Performed by: FAMILY MEDICINE

## 2021-09-20 PROCEDURE — G8399 PT W/DXA RESULTS DOCUMENT: HCPCS | Performed by: FAMILY MEDICINE

## 2021-09-20 PROCEDURE — G8420 CALC BMI NORM PARAMETERS: HCPCS | Performed by: FAMILY MEDICINE

## 2021-09-20 PROCEDURE — 4040F PNEUMOC VAC/ADMIN/RCVD: CPT | Performed by: FAMILY MEDICINE

## 2021-09-20 RX ORDER — LEVOTHYROXINE SODIUM 0.07 MG/1
75 TABLET ORAL DAILY
Qty: 90 TABLET | Refills: 3 | Status: SHIPPED | OUTPATIENT
Start: 2021-09-20 | End: 2022-09-08 | Stop reason: SDUPTHER

## 2021-09-20 NOTE — PROGRESS NOTES
Patient is here for follow up of hypercholesterolemia and hypothyroidism. No h/o fall allergies. She noticed some nasal congestion and post nasal drip while in [place] . Some irritated  throat . She took a COVID test once back in [place]  and was negative after 4-5 days of symptoms. Tm =99. No loss of taste or smell. Review of Systems    ROS: All other systems were reviewed and are negative . Patient's allergies and medications were reviewed. Patient's past medical, surgical, social , and family history were reviewed. OBJECTIVE:  /70   Pulse 70   Temp 97 °F (36.1 °C)   Resp 16   Wt 118 lb 12.8 oz (53.9 kg)   SpO2 98%   BMI 22.45 kg/m²     Physical Exam    General: NAD, cooperative, alert and oriented X 3. Mood / affect is good. good insight. well hydrated. Neck : no lymphadenopathy, supple, FROM  CV: Regular rate and rhythm , no murmurs/ rub/ gallop. No edema. Lungs : CTA bilaterally, breathing comfortably  Abdomen: positive bowel sounds, soft , non tender, non distended. No hepatosplenomegaly. No CVA tenderness. Skin: no rashes. Non tender. ASSESSMENT/  PLAN:  1. Hypothyroidism, unspecified type  - Controlled. Continue Synthroid 0.075 mg qd.   - TSH without Reflex; Future  - T4, Free; Future  - levothyroxine (SYNTHROID) 75 MCG tablet; Take 1 tablet by mouth Daily  Dispense: 90 tablet; Refill: 3    2. Pure hypercholesterolemia  - Controlled. Continue Lipitor 20 mg qd and low cholesterol diet. - Comprehensive Metabolic Panel; Future  - Lipid Panel; Future    3. Coronary artery disease involving native heart without angina pectoris, unspecified vessel or lesion type  - Stable. Continue Lipitor 20 mg qd and low cholesterol diet. - Comprehensive Metabolic Panel; Future  - Lipid Panel; Future  - CBC; Future    4. Need for hepatitis C screening test  - Hepatitis C Antibody; Future    5.  Needs flu shot  - INFLUENZA, QUADV, ADJUVANTED, 65 YRS =, IM, PF, PREFILL SYR, 0.5ML (FLUAD)    Follow up 6 months/ prn.

## 2021-09-21 ENCOUNTER — PATIENT MESSAGE (OUTPATIENT)
Dept: FAMILY MEDICINE CLINIC | Age: 76
End: 2021-09-21

## 2021-09-22 NOTE — TELEPHONE ENCOUNTER
From: Ansley El  To: Kvng Reyes MD  Sent: 9/21/2021 7:19 PM EDT  Subject: Test Results Question    I would like the latest lipid test results forwarded to my cardiologist, Dr Katherine Mcclain @ [facility], (fax) [phone], (office) [phone].  Thank you & please let me know if you have any questions regarding this request.   Jeffrey Borges

## 2021-11-20 RX ORDER — CIPROFLOXACIN 500 MG/1
500 TABLET, FILM COATED ORAL 2 TIMES DAILY
Qty: 14 TABLET | Refills: 0 | Status: SHIPPED | OUTPATIENT
Start: 2021-11-20 | End: 2021-11-27

## 2021-12-26 ENCOUNTER — PATIENT MESSAGE (OUTPATIENT)
Dept: FAMILY MEDICINE CLINIC | Age: 76
End: 2021-12-26

## 2021-12-27 NOTE — TELEPHONE ENCOUNTER
From: Miracle Hamilton  To: Dr. Brad Wells: 12/26/2021 10:02 AM EST  Subject: Diannadai He costed shot    Im wondering if I can receive the Covid booster shot in your office or if you can advise a place for me to make an appointment. I will be put of town for several months beginning January 10 & am having trouble locating an appointment.      Thank you,  Courtney Hyde 1945

## 2022-04-18 ENCOUNTER — TELEPHONE (OUTPATIENT)
Dept: FAMILY MEDICINE CLINIC | Age: 77
End: 2022-04-18

## 2022-04-18 RX ORDER — ATORVASTATIN CALCIUM 20 MG/1
20 TABLET, FILM COATED ORAL DAILY
Qty: 30 TABLET | Refills: 0 | Status: SHIPPED | OUTPATIENT
Start: 2022-04-18

## 2022-04-18 NOTE — TELEPHONE ENCOUNTER
Patient requesting a refill. Thanks.       atorvastatin (LIPITOR) 20 MG tablet [[id number]]     Order Details  Dose: 20 mg Route: Oral Frequency: DAILY  Dispense Quantity: -- Refills: --        Sig: Take 20 mg by mouth daily       Start Date: -- End Date: --  Written Date: -- Expiration Date: --  Ordering Date: 04/16/18      Providers    Authorizing Provider: Historical Provider, MD NPI: --  Ordering User:  Page Serra, [address] [place] [address] [id number] - Daquan Bradford[address] [phone]   [address] [place] [address]   Phone:  [phone]  Fax:  [phone]

## 2022-05-03 ENCOUNTER — TELEPHONE (OUTPATIENT)
Dept: FAMILY MEDICINE CLINIC | Age: 77
End: 2022-05-03

## 2022-05-24 ENCOUNTER — OFFICE VISIT (OUTPATIENT)
Dept: DERMATOLOGY | Age: 77
End: 2022-05-24
Payer: MEDICARE

## 2022-05-24 DIAGNOSIS — L81.4 LENTIGINES: ICD-10-CM

## 2022-05-24 DIAGNOSIS — L57.0 AK (ACTINIC KERATOSIS): ICD-10-CM

## 2022-05-24 DIAGNOSIS — D22.9 MULTIPLE NEVI: Primary | ICD-10-CM

## 2022-05-24 DIAGNOSIS — L82.1 SEBORRHEIC KERATOSIS: ICD-10-CM

## 2022-05-24 PROCEDURE — 99213 OFFICE O/P EST LOW 20 MIN: CPT | Performed by: DERMATOLOGY

## 2022-05-24 PROCEDURE — 1090F PRES/ABSN URINE INCON ASSESS: CPT | Performed by: DERMATOLOGY

## 2022-05-24 PROCEDURE — G8399 PT W/DXA RESULTS DOCUMENT: HCPCS | Performed by: DERMATOLOGY

## 2022-05-24 PROCEDURE — 1123F ACP DISCUSS/DSCN MKR DOCD: CPT | Performed by: DERMATOLOGY

## 2022-05-24 PROCEDURE — 1036F TOBACCO NON-USER: CPT | Performed by: DERMATOLOGY

## 2022-05-24 PROCEDURE — G8427 DOCREV CUR MEDS BY ELIG CLIN: HCPCS | Performed by: DERMATOLOGY

## 2022-05-24 PROCEDURE — G8420 CALC BMI NORM PARAMETERS: HCPCS | Performed by: DERMATOLOGY

## 2022-05-24 RX ORDER — CALCIUM CARBONATE 500(1250)
500 TABLET ORAL DAILY
COMMUNITY

## 2022-05-24 RX ORDER — BIOTIN 10 MG
TABLET ORAL
COMMUNITY

## 2022-05-24 NOTE — PROGRESS NOTES
[facility]  Morena Burkett MD  [address]  1945    65 y.o. female     Date of Visit: 5/24/2022    Chief Complaint: moles, lesions  Chief Complaint   Patient presents with    Skin Exam     Last seen: 1-2021    History of Present Illness:    1. Here for evaluation of multiple asx pigmented lesions on the trunk and extremities, present for many years; no change in size/shape/color of any lesions; no bleeding lesions. 2. Scattered brown spots on the face - had one large lentigo removed with laser in the past and has multiple scattered small lesions that were treated with LN2 and VI peel in 3-2019 with significant improvement. She denies other changing moles/lesions. 3. F/u AK - previously on the R cheek. No new concerns. Hx of eruption on the upper lip x few mos. Started with tight feeling and then peeling followed by Papules, ? Blister, crusts and peeling patch on the central/L upper lip. Took valtrex for ? HSV. Also used mometasone with clearance. Favored contact dermatitis over HSV. No flares since. Previously discussed:  Asx telangiectasias on both cheeks for several yrs; had 1 trx with Vbeam laser in past with focal improvement. 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 Review of Systems:  Gen: Feels well, good sense of health. Skin: No changing moles or lesions. Past Medical History, Family History, Surgical History, Medications and Allergies reviewed.     Past Medical History:   Diagnosis Date    CAD (coronary artery disease) 11/12    per CT cardiac [id number]    Carpal tunnel syndrome on left 09/2020    Colon polyp 2004    Compression fracture of L2 ([address] 11/2017    DDD (degenerative disc disease), lumbar     L2-3; L3-4, L4-5, L5-S1    Hyperlipidemia     Hypothyroidism     Lateral meniscal tear 12/15    Osteoarthritis of right knee 12/15    Osteopenia     per DEXA       Past Surgical History:   Procedure Laterality Date    CARPAL TUNNEL RELEASE  11/2019    COLONOSCOPY      KNEE ARTHROSCOPY Right 3/31/2016    RIGHT KNEE ARTHROSCOPY WITH PARTIAL LATERAL MENISCECTOMY    THYROID SURGERY  3/13    right- biopsy       Outpatient Medications Marked as Taking for the 5/24/22 encounter (Office Visit) with Jessica Woods MD   Medication Sig Dispense Refill    calcium carbonate (OSCAL) 500 MG TABS tablet Take 500 mg by mouth daily      Biotin 10 MG tablet Take by mouth      atorvastatin (LIPITOR) 20 MG tablet Take 1 tablet by mouth daily FOLLOW UP APPOINTMENT AND LABS ARE NEEDED. 30 tablet 0    zinc 50 MG CAPS Take by mouth      levothyroxine (SYNTHROID) 75 MCG tablet Take 1 tablet by mouth Daily 90 tablet 3    Ascorbic Acid (VITAMIN C) 500 MG tablet Take 1,000 mg by mouth daily.  MULTIPLE VITAMIN PO Take  by mouth.  Coenzyme Q-10 100 MG CAPS Take  by mouth. Allergies   Allergen Reactions    Bactrim Ds [Sulfamethoxazole-Trimethoprim] Other (See Comments)     Very bad headache       Physical Examination     Gen, NAD  FSE today    trunk and extremities with scattered brown macules and papules   R perinasal area with telangiectatic 3-4 mm blanchable macule - stable  Cheeks and nose with few telangiectasias  Face with scattered tan and light brown macules - many faded/cleared with peel  No AK's  Lips/perioral clear  Tiny scab behind the L ear            Assessment and Plan     1. Benign-appearing nevi and SK's  - monitor ABCD's of MM   Cont sun protection SPF 30+  encouraged skin check yearly (sooner if indicated), self checks     2. Lentigines and few SK's  - Encouraged sun protection with OTC SPF 30-50+ and OK to cont tretinoin cream  - laser and IPL versus peels - recommended return for treatment in the fall/winter instead of the summer if she desires treatment  - Rtn for VI peels if desired - 250 - would do with focal LN2 as needed for the face    3.  AK - no new lesions today - monitor rough macule behind the L ear  - sun protection    F/u 1 year.     She saw Dr. Kiah Pisano for Sculptra in the past.

## 2022-06-06 ENCOUNTER — TELEPHONE (OUTPATIENT)
Dept: FAMILY MEDICINE CLINIC | Age: 77
End: 2022-06-06

## 2022-06-06 ENCOUNTER — OFFICE VISIT (OUTPATIENT)
Dept: FAMILY MEDICINE CLINIC | Age: 77
End: 2022-06-06
Payer: MEDICARE

## 2022-06-06 VITALS
RESPIRATION RATE: 12 BRPM | OXYGEN SATURATION: 99 % | BODY MASS INDEX: 22.75 KG/M2 | WEIGHT: 120.4 LBS | SYSTOLIC BLOOD PRESSURE: 120 MMHG | TEMPERATURE: 96.8 F | HEART RATE: 70 BPM | DIASTOLIC BLOOD PRESSURE: 80 MMHG

## 2022-06-06 DIAGNOSIS — R31.9 HEMATURIA, UNSPECIFIED TYPE: ICD-10-CM

## 2022-06-06 DIAGNOSIS — N39.0 URINARY TRACT INFECTION WITH HEMATURIA, SITE UNSPECIFIED: Primary | ICD-10-CM

## 2022-06-06 DIAGNOSIS — R31.9 URINARY TRACT INFECTION WITH HEMATURIA, SITE UNSPECIFIED: Primary | ICD-10-CM

## 2022-06-06 DIAGNOSIS — K64.9 HEMORRHOIDS, UNSPECIFIED HEMORRHOID TYPE: ICD-10-CM

## 2022-06-06 LAB
BILIRUBIN, POC: NORMAL
BLOOD URINE, POC: NORMAL
CLARITY, POC: NORMAL
COLOR, POC: NORMAL
GLUCOSE URINE, POC: NEGATIVE
KETONES, POC: NEGATIVE
LEUKOCYTE EST, POC: NORMAL
NITRITE, POC: POSITIVE
PH, POC: 6
PROTEIN, POC: >=300
SPECIFIC GRAVITY, POC: >=1.03
UROBILINOGEN, POC: 1

## 2022-06-06 PROCEDURE — 1123F ACP DISCUSS/DSCN MKR DOCD: CPT | Performed by: FAMILY MEDICINE

## 2022-06-06 PROCEDURE — 1036F TOBACCO NON-USER: CPT | Performed by: FAMILY MEDICINE

## 2022-06-06 PROCEDURE — 81002 URINALYSIS NONAUTO W/O SCOPE: CPT | Performed by: FAMILY MEDICINE

## 2022-06-06 PROCEDURE — G8399 PT W/DXA RESULTS DOCUMENT: HCPCS | Performed by: FAMILY MEDICINE

## 2022-06-06 PROCEDURE — 1090F PRES/ABSN URINE INCON ASSESS: CPT | Performed by: FAMILY MEDICINE

## 2022-06-06 PROCEDURE — G8427 DOCREV CUR MEDS BY ELIG CLIN: HCPCS | Performed by: FAMILY MEDICINE

## 2022-06-06 PROCEDURE — 99214 OFFICE O/P EST MOD 30 MIN: CPT | Performed by: FAMILY MEDICINE

## 2022-06-06 PROCEDURE — G8420 CALC BMI NORM PARAMETERS: HCPCS | Performed by: FAMILY MEDICINE

## 2022-06-06 RX ORDER — CIPROFLOXACIN 500 MG/1
500 TABLET, FILM COATED ORAL 2 TIMES DAILY
Qty: 10 TABLET | Refills: 0 | Status: SHIPPED | OUTPATIENT
Start: 2022-06-06 | End: 2022-06-11

## 2022-06-06 ASSESSMENT — PATIENT HEALTH QUESTIONNAIRE - PHQ9
1. LITTLE INTEREST OR PLEASURE IN DOING THINGS: 0
SUM OF ALL RESPONSES TO PHQ QUESTIONS 1-9: 0
SUM OF ALL RESPONSES TO PHQ QUESTIONS 1-9: 0
2. FEELING DOWN, DEPRESSED OR HOPELESS: 0
SUM OF ALL RESPONSES TO PHQ QUESTIONS 1-9: 0
SUM OF ALL RESPONSES TO PHQ9 QUESTIONS 1 & 2: 0
SUM OF ALL RESPONSES TO PHQ QUESTIONS 1-9: 0

## 2022-06-06 NOTE — PROGRESS NOTES
Patient is here for an acute visit for urinary frequency and difficulty with emptying bladder. Some back ache yesterday . Symptoms start within 48 hours. No fever. No fever reducer taken today . She noticed blood in urine this am.   She denies abnormal vaginal bleeding, discharge, itching , odor or unusual pelvic pain. Last UTI was a couple of years ago. No sexual activity > 1 month. 1 coffee/ day ;  1 red wine per day . Denies nausea, vomiting, diarrhea . Has h/o hemorrhoids. Review of Systems    ROS: All other systems were reviewed and are negative . Patient's allergies and medications were reviewed. Patient's past medical, surgical, social , and family history were reviewed. Results for POC orders placed in visit on 06/06/22   POCT Urinalysis no Micro   Result Value Ref Range    Color, UA RED     Clarity, UA HAZY     Glucose, UA POC NEGATIVE     Bilirubin, UA MODERATE     Ketones, UA NEGATIVE     Spec Grav, UA >=1.030     Blood, UA POC LARGE     pH, UA 6.0     Protein, UA POC >=300     Urobilinogen, UA 1.0     Leukocytes, UA LARGE     Nitrite, UA POSITIVE       OBJECTIVE:  /80   Pulse 70   Temp 96.8 °F (36 °C)   Resp 12   Wt 120 lb 6.4 oz (54.6 kg)   SpO2 99%   BMI 22.75 kg/m²     Physical Exam    General: NAD, cooperative, alert and oriented X 3. Mood / affect is good. good insight. well hydrated. Neck : no lymphadenopathy, supple, FROM  CV: Regular rate and rhythm , no murmurs/ rub/ gallop. No edema. Lungs : CTA bilaterally, breathing comfortably  Abdomen: positive bowel sounds, soft , non tender, non distended. No hepatosplenomegaly. No CVA tenderness. Skin: no rashes. Non tender. ASSESSMENT/  PLAN:  1. Urinary tract infection with hematuria, site unspecified  - Push fluids - water. - ciprofloxacin (CIPRO) 500 mg tablet; Take 1 tablet by mouth 2 times daily for 5 days  Dispense: 10 tablet; Refill: 0  - Culture, Urine    2.  Hematuria, unspecified type  - Push fluids - water.    - POCT Urinalysis no Micro  - ciprofloxacin (CIPRO) 500 mg tablet; Take 1 tablet by mouth 2 times daily for 5 days  Dispense: 10 tablet; Refill: 0  - Culture, Urine    3. Hemorrhoids, unspecified hemorrhoid type  - Push fluids - water and dietary fiber daily.  - Add fiber supplement once per day ( Metamucil, Citrucel , or Fibercon). F/u if no improvement 5d/ prn increased symptoms.

## 2022-06-06 NOTE — TELEPHONE ENCOUNTER
Patient has blood in her urine and she can't empty her bladder. She also has lower back pain. The symptoms started yesterday . No appts available today. Can patient be worked in today. [phone]

## 2022-06-08 LAB
ORGANISM: ABNORMAL
URINE CULTURE, ROUTINE: ABNORMAL

## 2022-06-21 ENCOUNTER — TELEPHONE (OUTPATIENT)
Dept: DERMATOLOGY | Age: 77
End: 2022-06-21

## 2022-06-21 NOTE — TELEPHONE ENCOUNTER
Pt calling states she has white spots on the inside of her cheeks that she has some concern about been there for about 6 weeks now pls return call back @ [phone]

## 2022-06-22 ENCOUNTER — TELEPHONE (OUTPATIENT)
Dept: FAMILY MEDICINE CLINIC | Age: 77
End: 2022-06-22

## 2022-06-22 DIAGNOSIS — K13.70 MOUTH LESION: Primary | ICD-10-CM

## 2022-06-22 NOTE — TELEPHONE ENCOUNTER
Returned call to patient-phone only rang with no voice mail to return call. I will try patient back.

## 2022-06-22 NOTE — TELEPHONE ENCOUNTER
Specialist name:Dr Jackelyn Jacob(dermatology    Date appointment no date yet ([phone]        Reason for referral:    Diagnosis:not determined yes      Insurance Name: [address] complete          Insurance ID#: [id number]    Insurance Group #      Procedure Code:     Specialist NPI#    Specialist Tax ID#          Referrals require 7-10 business days notice for completion. It is the patient's responsibility to ensure the provider is in-network with their insurance company. If an insurance referral is required, authorization must be complete before the appointment or patient may be responsible for the bill.

## 2022-06-28 ENCOUNTER — TELEPHONE (OUTPATIENT)
Dept: DERMATOLOGY | Age: 77
End: 2022-06-28

## 2022-06-28 NOTE — TELEPHONE ENCOUNTER
Pt called in stated Ivin Simmonds called her she missed call didn't listen to message pt ask for a call [phone]  it's concerning her test results she thinks.

## 2022-06-28 NOTE — TELEPHONE ENCOUNTER
Pt called in stated Cydney Hearing called her she missed call didn't listen to message pt ask for a call [phone]  it's concerning her test results she thinks please advise thanks

## 2022-06-28 NOTE — TELEPHONE ENCOUNTER
Tried patient's home phone- answered, patient can be contacted on mobile. LVM for patient's mobile to return call.

## 2022-06-29 NOTE — TELEPHONE ENCOUNTER
Patient concerned about white patches inside both  cheeks, noticed by her dentist the end of May. Per patient, her dentist said to see if the areas went away but the areas remain. Patient denies any pain, she is aware of areas and patient explains that now this is more a mental concern after researching on the internet. Patient was given information for a former dentist by Dr. Mercy Rollins, but the office has not returned her call for an appointment. I advised patient that she could call an ENT for an appointment while waiting on further instructions from dermatologist.      Suggestions?

## 2022-06-29 NOTE — TELEPHONE ENCOUNTER
Contacted patient regarding scheduling an appointment. Patient scheduled with ENT-Dr. Shan Holloway on 7/1/2022. I will call patient on 7/5/2022 to check in on status of visit.

## 2022-06-29 NOTE — TELEPHONE ENCOUNTER
As long as these are asymptomatic and her dentist had no concern for cancer, go ahead and schedule for later this summer and we can keep her on a waiting list to try to get her in a cancellation.   Was it Dr. Tonio Morel that her dentist sent her to?

## 2022-07-01 ENCOUNTER — OFFICE VISIT (OUTPATIENT)
Dept: ENT CLINIC | Age: 77
End: 2022-07-01
Payer: MEDICARE

## 2022-07-01 VITALS
SYSTOLIC BLOOD PRESSURE: 131 MMHG | BODY MASS INDEX: 22.66 KG/M2 | WEIGHT: 120 LBS | DIASTOLIC BLOOD PRESSURE: 81 MMHG | HEIGHT: 61 IN | HEART RATE: 59 BPM

## 2022-07-01 DIAGNOSIS — D49.0 NEOPLASM OF BUCCAL MUCOSA: ICD-10-CM

## 2022-07-01 DIAGNOSIS — K13.21 LEUKOPLAKIA ORAL MUCOSA: Primary | ICD-10-CM

## 2022-07-01 DIAGNOSIS — H91.93 BILATERAL HEARING LOSS, UNSPECIFIED HEARING LOSS TYPE: ICD-10-CM

## 2022-07-01 PROCEDURE — G8427 DOCREV CUR MEDS BY ELIG CLIN: HCPCS | Performed by: OTOLARYNGOLOGY

## 2022-07-01 PROCEDURE — 40808 BIOPSY OF MOUTH LESION: CPT | Performed by: OTOLARYNGOLOGY

## 2022-07-01 PROCEDURE — 1123F ACP DISCUSS/DSCN MKR DOCD: CPT | Performed by: OTOLARYNGOLOGY

## 2022-07-01 PROCEDURE — 1090F PRES/ABSN URINE INCON ASSESS: CPT | Performed by: OTOLARYNGOLOGY

## 2022-07-01 PROCEDURE — G8399 PT W/DXA RESULTS DOCUMENT: HCPCS | Performed by: OTOLARYNGOLOGY

## 2022-07-01 PROCEDURE — 99204 OFFICE O/P NEW MOD 45 MIN: CPT | Performed by: OTOLARYNGOLOGY

## 2022-07-01 PROCEDURE — G8420 CALC BMI NORM PARAMETERS: HCPCS | Performed by: OTOLARYNGOLOGY

## 2022-07-01 PROCEDURE — 1036F TOBACCO NON-USER: CPT | Performed by: OTOLARYNGOLOGY

## 2022-07-01 ASSESSMENT — ENCOUNTER SYMPTOMS
BLOOD IN STOOL: 0
STRIDOR: 0
BACK PAIN: 0
SORE THROAT: 0
CHOKING: 0
COUGH: 0
EYE ITCHING: 0
VOMITING: 0
CONSTIPATION: 0
WHEEZING: 0
DIARRHEA: 0
TROUBLE SWALLOWING: 0
PHOTOPHOBIA: 0
SINUS PRESSURE: 0
COLOR CHANGE: 0
SINUS PAIN: 0
EYE DISCHARGE: 0
RHINORRHEA: 0
SHORTNESS OF BREATH: 0
VOICE CHANGE: 0
NAUSEA: 0
FACIAL SWELLING: 0

## 2022-07-01 NOTE — PROGRESS NOTES
[facility]  [address] [address] [facility], [address] [place] [address] [place]  P: [phone]  F: [phone]       Patient     Avril Lomax  1945    ChiefComplaint     Chief Complaint   Patient presents with    New Patient     Patient is here today because she has a white substance on the inside of her right cheek       History of Present Illness     Avril Lomax is a pleasant 65 y.o. female who presents as a new consultation referred by her primary care physician for a lesion of the buccal mucosa. Back in May, she saw her dentist and he noticed a white plaque on the right buccal mucosa. She did have some issues with an area of a tooth that may have been rubbing. The tooth was ground down. She has been wearing a bite guard at nighttime. She does think she grinds her teeth at night. She does not think the lesion has grown. It is not causing any discomfort. It does not bleed. She does drink 1 to 2 glasses of wine a night. Denies fevers, chills, night sweats or unexpected weight loss. Denies numbness or weakness of the mouth. Denies dysphagia or change in voice. Denies neck masses. Additionally, patient complains of loss of hearing over the past few years. She has noticed difficulty hearing and mass and also her children. Denies tinnitus, otorrhea, otalgia, spinning sensation. Denies a history of ear infections or ear surgeries. Denies a family history of ear problems.     Past Medical History     Past Medical History:   Diagnosis Date    CAD (coronary artery disease) 11/12    per CT cardiac klcso=[id number]    Carpal tunnel syndrome on left 09/2020    Colon polyp 2004    Compression fracture of L2 ([address] 11/2017    DDD (degenerative disc disease), lumbar     L2-3; L3-4, L4-5, L5-S1    Hyperlipidemia     Hypothyroidism     Lateral meniscal tear 12/15    Osteoarthritis of right knee 12/15    Osteopenia     per DEXA       Past Surgical History     Past Surgical History:   Procedure Laterality Date    CARPAL TUNNEL RELEASE  2019    COLONOSCOPY      KNEE ARTHROSCOPY Right 3/31/2016    RIGHT KNEE ARTHROSCOPY WITH PARTIAL LATERAL MENISCECTOMY    THYROID SURGERY  3/13    right- biopsy       Family History     Family History   Problem Relation Age of Onset    Ovarian Cancer Mother     Colon Cancer Mother 71    Other Mother         brain tumor - ? CA    Cancer Mother         colon    Heart Disease Father     Diabetes Father     High Blood Pressure Father     Mult Sclerosis Sister     Arthritis Brother        Social History     Social History     Socioeconomic History    Marital status:      Spouse name: Not on file    Number of children: Not on file    Years of education: Not on file    Highest education level: Not on file   Occupational History    Not on file   Tobacco Use    Smoking status: Former Smoker     Packs/day: 0.05     Years: 14.00     Pack years: 0.70     Types: Cigarettes     Quit date: 1976     Years since quittin.5    Smokeless tobacco: Never Used    Tobacco comment: smoked sometimes   Substance and Sexual Activity    Alcohol use: Yes     Alcohol/week: 7.0 standard drinks     Types: 7 Glasses of wine per week    Drug use: No    Sexual activity: Yes     Partners: Male   Other Topics Concern    Not on file   Social History Narrative    Not on file     Social Determinants of Health     Financial Resource Strain: Low Risk     Difficulty of Paying Living Expenses: Not hard at all   Food Insecurity: No Food Insecurity    Worried About Running Out of Food in the Last Year: Never true    [address] in the Last Year: Never true   Transportation Needs: No Transportation Needs    Lack of Transportation (Medical): No    Lack of Transportation (Non-Medical):  No   Physical Activity:     Days of Exercise per Week: Not on file    Minutes of Exercise per Session: Not on file   Stress:     Feeling of Stress : Not on file   Social Connections: Once Antonia Whitfield MD           Review of Systems     Review of Systems   Constitutional: Negative for activity change, appetite change, chills, diaphoresis, fatigue, fever and unexpected weight change. HENT: Positive for hearing loss. Negative for congestion, dental problem, drooling, ear discharge, ear pain, facial swelling, mouth sores, nosebleeds, postnasal drip, rhinorrhea, sinus pressure, sinus pain, sneezing, sore throat, tinnitus, trouble swallowing and voice change. Eyes: Negative for photophobia, discharge, itching and visual disturbance. Respiratory: Negative for cough, choking, shortness of breath, wheezing and stridor. Gastrointestinal: Negative for blood in stool, constipation, diarrhea, nausea and vomiting. Endocrine: Negative for cold intolerance, heat intolerance, polyphagia and polyuria. Musculoskeletal: Negative for back pain, gait problem, neck pain and neck stiffness. Skin: Negative for color change, pallor, rash and wound. Neurological: Negative for dizziness, syncope, facial asymmetry, speech difficulty, light-headedness, numbness and headaches. Hematological: Negative for adenopathy. Does not bruise/bleed easily. Psychiatric/Behavioral: Negative for agitation, confusion and sleep disturbance. PhysicalExam     Vitals:    07/01/22 0911   BP: 131/81   Pulse: 59       Physical Exam  Constitutional:       Appearance: She is well-developed. HENT:      Head: Normocephalic and atraumatic. Not macrocephalic and not microcephalic. No raccoon eyes, Roberts's sign, abrasion, contusion, right periorbital erythema, left periorbital erythema or laceration. Hair is normal.      Jaw: No trismus. Right Ear: Hearing, tympanic membrane and external ear normal. No decreased hearing noted. No drainage, swelling or tenderness. No middle ear effusion. No mastoid tenderness. Tympanic membrane is not perforated, retracted or bulging. Tympanic membrane has normal mobility. Left Ear: Hearing, tympanic membrane and external ear normal. No decreased hearing noted. No drainage, swelling or tenderness. No middle ear effusion. No mastoid tenderness. Tympanic membrane is not perforated, retracted or bulging. Tympanic membrane has normal mobility. Nose: No nasal deformity, septal deviation, laceration, mucosal edema or rhinorrhea. Right Sinus: No maxillary sinus tenderness or frontal sinus tenderness. Left Sinus: No maxillary sinus tenderness or frontal sinus tenderness. Mouth/Throat:      Mouth: Mucous membranes are not pale, not dry and not cyanotic. No lacerations or oral lesions. Dentition: Normal dentition. No dental caries or dental abscesses. Pharynx: Uvula midline. No oropharyngeal exudate, posterior oropharyngeal erythema or uvula swelling. Tonsils: No tonsillar abscesses. Eyes:      General: Lids are normal.         Right eye: No discharge. Left eye: No discharge. Extraocular Movements:      Right eye: Normal extraocular motion and no nystagmus. Left eye: Normal extraocular motion and no nystagmus. Conjunctiva/sclera:      Right eye: No chemosis or exudate. Left eye: No chemosis or exudate. Neck:      Thyroid: No thyroid mass or thyromegaly. Vascular: Normal carotid pulses. Trachea: No tracheal tenderness or tracheal deviation. Cardiovascular:      Rate and Rhythm: Normal rate and regular rhythm. Pulmonary:      Effort: No tachypnea, bradypnea or respiratory distress. Breath sounds: No stridor. Musculoskeletal:      Right shoulder: Normal range of motion. Cervical back: Neck supple. Lymphadenopathy:      Head:      Right side of head: No submental, submandibular, tonsillar, preauricular, posterior auricular or occipital adenopathy. Left side of head: No submental, submandibular, tonsillar, preauricular, posterior auricular or occipital adenopathy.       Cervical: No cervical Sacha Shabazz, Audiology, [facility] for audiogram.      Portions of this note were dictated using Dragon.  There may be linguistic errors secondary to the use of this program.

## 2022-07-06 ENCOUNTER — PROCEDURE VISIT (OUTPATIENT)
Dept: AUDIOLOGY | Age: 77
End: 2022-07-06
Payer: MEDICARE

## 2022-07-06 DIAGNOSIS — H90.3 SENSORINEURAL HEARING LOSS, BILATERAL: Primary | ICD-10-CM

## 2022-07-06 PROCEDURE — 92567 TYMPANOMETRY: CPT | Performed by: AUDIOLOGIST

## 2022-07-06 PROCEDURE — 92557 COMPREHENSIVE HEARING TEST: CPT | Performed by: AUDIOLOGIST

## 2022-07-06 NOTE — PATIENT INSTRUCTIONS
Good Communication Strategies    Communication can be challenging for anyone, but can be especially difficult for those with some degree of hearing loss. While we may not be able to control every factor that may lead to difficulty with communication, there are Good Communication Strategies that we can all use in our day-to-day lives. Communication takes both parties working together for it to be successful. Tips as a Listener:   1. Control your environment. It is important to limit the amount of background noise in the room when possible. You should also consider having a good light source in the room to best see the other person. 2. Ask for clarification. Instead of saying \"What?\", you can use parts of what you heard to make a new question. For example, if you heard the word \"Thursday\" but not the rest of the week, you may ask \"What was that about Thursday? \" or \"What did you want to do Thursday? \". This shows the person talking that you are listening and will help them better explain what they are saying. 3. Be an advocate for yourself. If you are hearing but not understanding, tell the other person \"I can hear you, but I need you to slow down when you speak. \"  Or if someone is facing the other direction, say \"I cannot hear you when you are not looking at me when we talk. \"       Tips as a Talker:   - Sit or stand 3 to 6 feet away to maximize audibility         -- It is unrealistic to believe someone else will fully hear your message if you are speaking from across the room or in a different room in the house   - Stay at eye level to help with visual cues   - Make sure you have the persons attention before speaking   - Use facial expressions and gestures to accentuate your message   - Raise your voice slightly (do not scream)   - Speak slowly and distinctly   - Use short, simple sentences   - Rephrase your words if the person is having a hard time understanding you    - To avoid distortion, dont speak directly into a persons ear      Some additional items that may be helpful:   - Remain patient - this is important for both parties   - Write down items that still cannot be heard/understood. You may write with pen/paper or consider typing/texting on a cell phone or smart device. - If background noise is unavoidable, try to keep yourself in a good position in the room. By sitting at a rangel on the side of the restaurant (preferably a corner), it will be easier to communicate than if you were sitting at a table in the middle with background noise surrounding you. Keep yourself positioned away from music speakers or heavy foot traffic.   - If you have difficulty with the television, consider these options:      -- Use closed-captioning, which is a setting you can turn on that displays the spoken words in a written form on the screen. There may be a slight delay, but this can help fill in missing information. This can be especially helpful when watching programs with accented speech. -- Consider use of a sound bar or speakers that come from the front of the TV. With modern flat screen TVs, many of them have speakers that come out of the back of the device, which makes sound bounce off the wall behind it, then go into the room. Sound bars can allow the sound to go straight in your direction and can improve sound quality. -- Consider ear level devices to help improve the volume and/or sound quality of the program.  There are devices that work like headphones that you can adjust the volume for your ears while others can have the volume at a more comfortable level, such as \"TV Ears\". Most hearing aids have devices that allow them to connect directly to the TV and improve sound quality. Hearing Loss: Care Instructions  Your Care Instructions      Hearing loss is a sudden or slow decrease in how well you hear. It can range from mild to profound.  Permanent hearing loss can occur with aging, and it can happen when you are exposed long-term to loud noise. Examples include listening to loud music, riding motorcycles, or being around other loud machines. Hearing loss can affect your work and home life. It can make you feel lonely or depressed. You may feel that you have lost your independence. But hearing aids and other devices can help you hear better and feel connected to others. Follow-up care is a key part of your treatment and safety. Be sure to make and go to all appointments, and call your doctor if you are having problems. It's also a good idea to know your test results and keep a list of the medicines you take. How can you care for yourself at home? · Avoid loud noises whenever possible. This helps keep your hearing from getting worse. Always wear hearing protection around loud noises. · If appropriate, wear hearing aid(s) as directed. It is recommended that hearing aids are worn during all waking hours to keep your brain active and give it access to the sounds it is missing. · If you are beginning your process with hearing aid(s), schedule a \"Hearing Aid Evaluation\" with an audiologist to discuss your lifestyle, features of hearing aid technology, and styles of hearing aids available. It is recommended that you contact your insurance company to determine if you have a hearing aid benefit, as this may dictate who you can see for these services. · Have hearing tests as your doctor suggests. They can show whether your hearing has changed. Your hearing aid may need to be adjusted. · Use other assistive devices as needed. These may include:  ? Telephone amplifiers and hearing aids that can connect to a television, stereo, radio, or microphone. ? Devices that use lights or vibrations. These alert you to the doorbell, a ringing telephone, or a baby monitor. ? Television closed-captioning. This shows the words at the bottom of the screen. Most new TVs can do this. ? TTY (text telephone). This lets you type messages back and forth on the telephone instead of talking or listening. These devices are also called TDD. When messages are typed on the keyboard, they are sent over the phone line to a receiving TTY. The message is shown on a monitor. · Use pagers, fax machines, text, and email if it is hard for you to communicate by telephone. · Try to learn a listening technique called speech-reading. It is not lip-reading. You pay attention to people's gestures, expressions, posture, and tone of voice. These clues can help you understand what a person is saying. Face the person you are talking to, and have him or her face you. Make sure the lighting is good. You need to see the other person's face clearly. · Think about counseling if you need help to adjust to your hearing loss. When should you call for help? Watch closely for changes in your health, and be sure to contact your doctor if:    · You think your hearing is getting worse. · You have new symptoms, such as dizziness or nausea. Noise-Induced Hearing Loss  What it is, and what you can do to prevent it    Exposure to loud sounds, in an occupational setting or recreational, can cause permanent hearing loss. Sound is measured in decibels (dB). Noise-induced hearing loss is the ONLY type of preventable hearing loss. Hearing loss related to noise exposure can occur at any age. There are small sensory cells, called inner and outer hair cells, within the inner ear (cochlea). These cells process the loudness (intensity) and pitch (frequency) of sound and send the signal to the brain via our auditory nerve (vestibulocochlear nerve, cranial nerve VIII). When these cells are damaged, they can result in permanent hearing loss and/or tinnitus. The hair cells responsible for high frequency sounds, like birds chirping, are most likely to be damaged due to loud sounds.   The high frequency sounds are also very important for our clarity and understanding of speech. OCCUPATIONAL NOISE EXPOSURE RECREATIONAL NOISE EXPOSURE   Some jobs may have exposure to loud sounds in the workplace. These jobs may include but are not limited to:  [employer]   Construction   Welding   Landscaping   Hairdressing/hairstyling   Musicians  [employer]    ... And more! Many activities outside of work may cause permanent hearing loss. These activities may include but are not limited to:  Lawnmowers, leaf blowers  [employer] (such as pigs squealing)   Chainsaws and other power tools  [employer] musical instruments and/or singing   Listening to music too loudly - at concerts, through stereo, through ear buds or headphones   Attending sporting events   Attending fireworks shows or using fireworks at home  [employer] of firearms   . .. And more! REDUCE OR PROTECT YOUR EARS FROM NOISE EXPOSURE    To do your best to avoid noise-induced hearing loss, here are some tips:   Limit exposure to loud sounds. 85 dB (decibels) is safe for 8 hours. As sounds are louder, the length of time the sound is safe lessens. These numbers are cumulative across a 24-hour period. (NIOSH and CDC, 2002)  o 85 dB is safe for 8 hours  o 88 dB is safe for 4 hours  o 91 dB is safe for 2 hours  o 94 dB is safe for 1 hour  o 97 dB is safe for 30 minutes  o 100 dB is safe for 15 minutes  o 103 dB is safe for 7.5 minutes  o 106 dB is safe for 3.75 minutes  o 109 dB is safe for LESS THAN 2 minutes  o 112 dB is safe for LESS THAN 1 minute  o 115 dB is safe for ~ 30 seconds  o 130 dB can cause IMMEDIATE hearing loss   If you are unsure if a sound is too loud, consider checking the sound level with a \"sound level meter\". There are apps on smart devices, such as \"Decibel X\", that can measure the loudness of the sound.   They are not as accurate as expensive equipment used by scientists, but it will give you a guesstimate of how loud the sound is, and if it may be damaging to your hearing.  If you cannot avoid loud sounds, here are ways to reduce your exposure:  o 1. Wear hearing protection  - Ear plugs and protective ear muffs can be used to reduce the intensity of the sound. The higher the NRR (noise reduction rating), the better reduction of the intensity of the sound   o 2. Turn the volume down  - When listening to music, turn the volume down, especially when wearing ear buds or headphones. A good rule of thumb is to not go beyond the middle setting on your device. If you can't hear someone talking to you from arm's length away, your music may be at a level that it can cause damage. If someone else can hear your music from 3 feet away, it may also be at a level that it can cause damage. o 3. Walk away from the sound  - If you do not have the ability to wear hearing protection or turn down the volume of the sound, you should do your best to move away from the source of the sound. - Sound decreases in intensity as we move further from the source. The sound will decrease by 6 dB for every doubling of distance from the sound source. TYPES OF HEARING PROTECTION    The most common types of hearing protection are protective ear muffs and ear plugs. Protective ear muffs are commonly found at home improvement or sporting good stores, they can be worn time and time again and are great if you need to take your hearing protection off frequently. Ear plugs are often made of foam or soft silicone. The foam ones are designed for one-time use, while silicone ear plugs may be used multiple times. There are also \"filtered\" ear plugs that help provide even attenuation of the sound across all frequencies. These are great for listening to music or going to concerts, and allow for better understanding of speech in louder environments.   They can be purchased at music stores or online retailers (search \"Ety Plugs\" or \"filtered ear plugs\"), or custom earmolds can be made with an audiologist.    There are \"custom\" hearing protection devices that you can further discuss with your audiologist based on your specific needs, if desired. Exposure to these sounds may cause permanent damage to your hearing.   If you suspect your hearing has changed, it is recommended that you have your hearing tested by your audiologist.

## 2022-07-06 NOTE — Clinical Note
Dr. Mario Alberto Booker,    Please see note from this patient's audiogram.  Please let me know if there is anything further you need.       Milton Garcia [id number] [place]  Audiologist

## 2022-07-06 NOTE — PROGRESS NOTES
Elis Marmolejo   1945, 65 y.o. female   [id number]       Referring Provider: Sherly Woods DO  Referral Type: In an order in [address] [place] [address]    Reason for Visit: Evaluation of suspected change in hearing, tinnitus, or balance. ADULT AUDIOLOGIC EVALUATION      Elis Marmolejo is a 65 y.o. female seen today, 7/6/2022, for an initial audiologic evaluation in this office. A chart review revealed she had a previous audiogram at [facility] 8/31/2016 with mild to mod-severe SNHL bilaterally with excellent word recognition; audiogram not visible at time of appointment. AUDIOLOGIC AND OTHER PERTINENT MEDICAL HISTORY:        Elis Marmolejo noted decreased hearing bilaterally, gradual over past few years, difficulty hearing in groups and conversation with her children; wears Air Pods when walking, raises volume at times, no other remarkable history of noise exposure. Elis Marmolejo denied otalgia, aural fullness, otorrhea, tinnitus, dizziness, imbalance, history of head trauma, history of ear surgery, and family history of early onset hearing loss. IMPRESSIONS:       Today's results are consistent with bilateral sensorineural hearing loss with excellent word recognition for conversational speech bilaterally; right ear with reduced TM mobility and left ear with normal middle ear function. Hearing loss is significant enough to result in difficulty understanding speech in at least some listening environments. Discussed good communication strategies and considerations for amplification when ready. Follow medical recommendations from Dr. Lisa Dougherty. ASSESSMENT AND FINDINGS:       Otoscopy revealed: Nonocclusive cerumen in ear canals bilaterally RE>LE      RIGHT EAR:  Hearing Sensitivity: Borderline-normal sloping to moderately-severe sensorineural hearing loss. Speech Recognition Threshold: 25 dBHL  Word Recognition: Excellent (96%), based on NU-6 25-word list at 55 dBHL using recorded speech stimuli.         LEFT EAR:  Hearing Sensitivity: Within normal limits sloping to moderately-severe sensorineural hearing loss. Speech Recognition Threshold: 25 dBHL  Word Recognition: Excellent (96%), based on NU-6 25-word list at 55 dBHL using recorded speech stimuli. Tympanometry: Normal peak pressure and compliance, Type A tympanogram, consistent with normal middle ear function. NOTE: An asymmetry of 15 dBHL is present at 6000 Hz with left ear worse than right ear; all other findings are symmetric. Reliability: Good  Transducer: Inserts    See scanned audiogram dated 7/6/2022 for results. PATIENT EDUCATION:       The following items were discussed with the patient:   - Good Communication Strategies  - Hearing Loss and Hearing Aids  - Noise-Induced Hearing Loss and use of Hearing Protection Devices (HPDs)     Educational information was shared in the After Visit Summary. RECOMMENDATIONS:                                                                                                                                                                                                                                                                      The following items are recommended based on patient report and results from today's appointment:  - Continue medical follow-up with Delfino Severin, DO.  - Retest hearing as medically indicated and/or sooner if a change in hearing is noted. - If desired, schedule a Hearing Aid Evaluation (HAE) appointment to discuss hearing aid options. - Utilize \"Good Communication Strategies\" as discussed to assist in speech understanding with communication partners. - Use hearing protection devices (HPDs), such as protective ear muffs and ear plugs, when exposed to dangerous sound levels. [facility] [place], [place]  Audiologist      Chart CC'd to:  Delfino Severin, DO      Degree of   Hearing Sensitivity dB Range   Within Normal Limits (WNL) 0 - 20   Mild 20 - 40   Moderate 40 - 55   Moderately-Severe 55 - 70   Severe 70 - 90   Profound 90 +

## 2022-07-07 ENCOUNTER — TELEPHONE (OUTPATIENT)
Dept: ENT CLINIC | Age: 77
End: 2022-07-07

## 2022-07-07 NOTE — TELEPHONE ENCOUNTER
Spoke with patient regarding pathology results, she would like to know if there is anything she could take to make it go away and what actually is it

## 2022-07-11 ENCOUNTER — TELEPHONE (OUTPATIENT)
Dept: ENT CLINIC | Age: 77
End: 2022-07-11

## 2022-07-11 DIAGNOSIS — K13.21 LEUKOPLAKIA ORAL MUCOSA: Primary | ICD-10-CM

## 2022-07-11 NOTE — TELEPHONE ENCOUNTER
----- Message from Luisana King DO sent at 7/11/2022  2:50 PM EDT -----  Hearing test shows normal to moderate-severe downward sloping hearing loss. This is age related hearing loss. Not significant enough to warrant hearing aids, but may need them in the next few years.

## 2022-07-15 NOTE — TELEPHONE ENCOUNTER
I read her path results and it looked like Dr. Rivera Innocent recommendation was the f/u with him annually for monitoring. If this had come back as something like an inflammatory condition, dermatologists usually help manage those but her results are not something that dermatologists usually manage. It is usually something monitored by an ENT. If she is worried about anything else going on or if it's getting worse or she is having symptoms, I'm happy to see her.

## 2022-07-21 ENCOUNTER — TELEMEDICINE (OUTPATIENT)
Dept: FAMILY MEDICINE CLINIC | Age: 77
End: 2022-07-21
Payer: MEDICARE

## 2022-07-21 ENCOUNTER — TELEPHONE (OUTPATIENT)
Dept: FAMILY MEDICINE CLINIC | Age: 77
End: 2022-07-21

## 2022-07-21 DIAGNOSIS — U07.1 COVID-19 VIRUS INFECTION: Primary | ICD-10-CM

## 2022-07-21 PROCEDURE — 1123F ACP DISCUSS/DSCN MKR DOCD: CPT | Performed by: FAMILY MEDICINE

## 2022-07-21 PROCEDURE — 99213 OFFICE O/P EST LOW 20 MIN: CPT | Performed by: FAMILY MEDICINE

## 2022-07-21 RX ORDER — BENZONATATE 200 MG/1
200 CAPSULE ORAL 3 TIMES DAILY PRN
Qty: 30 CAPSULE | Refills: 0 | Status: SHIPPED | OUTPATIENT
Start: 2022-07-21 | End: 2022-07-31

## 2022-07-21 NOTE — TELEPHONE ENCOUNTER
----- Message from Vinh Crew sent at 7/20/2022  2:34 PM EDT -----  Subject: Message to Provider    QUESTIONS  Information for Provider? Pt tested positive for Covid today. She has a   101.8 fever, slight cough, headache, and drainage. Please call pt to   advise what to do.  ---------------------------------------------------------------------------  --------------  Obed Arellano INFO  [phone]; OK to leave message on voicemail  ---------------------------------------------------------------------------  --------------  SCRIPT ANSWERS  Relationship to Patient?  Self

## 2022-07-21 NOTE — PROGRESS NOTES
2022    TELEHEALTH EVALUATION -- Audio/Visual (During DVEKE-43 public health emergency)    HPI:    Mercy Cowart (:  1945) has requested an audio/video evaluation for the following concern(s):    COVID 19 infection - tested positive yesterday    4 days ago went to bed with a HA. The next day she woke up with a fever. Temperature up to 101.8.  99 this am.  Coughing mostly at night. No chest pain or dyspnea. + pnd and congestion. Mild ST. No nausea, vomiting, diarrhea   + fatigue. Was able to go for a walk yesterday. Rx: Ibuprofen helps. Cough medi is not helping. Vaccinated x 3 against COVID     Lab Results   Component Value Date/Time     2021 08:53 AM    K 4.6 2021 08:53 AM    CL 95 2021 08:53 AM    CO2 24 2021 08:53 AM    BUN 10 2021 08:53 AM    CREATININE <0.5 2021 08:53 AM    GLUCOSE 83 2021 08:53 AM    CALCIUM 9.7 2021 08:53 AM       Review of Systems    Outpatient Medications Marked as Taking for the 22 encounter (Telemedicine) with Marcie Long MD   Medication Sig Dispense Refill    calcium carbonate (OSCAL) 500 MG TABS tablet Take 500 mg by mouth daily      Biotin 10 MG tablet Take by mouth      atorvastatin (LIPITOR) 20 MG tablet Take 1 tablet by mouth daily FOLLOW UP APPOINTMENT AND LABS ARE NEEDED. 30 tablet 0    zinc 50 MG CAPS Take by mouth      levothyroxine (SYNTHROID) 75 MCG tablet Take 1 tablet by mouth Daily 90 tablet 3    Ascorbic Acid (VITAMIN C) 500 MG tablet Take 1,000 mg by mouth daily. MULTIPLE VITAMIN PO Take  by mouth. Coenzyme Q-10 100 MG CAPS Take  by mouth. Social History     Tobacco Use    Smoking status: Former     Packs/day: 0.05     Years: 14.00     Pack years: 0.70     Types: Cigarettes     Quit date: 1976     Years since quittin.5    Smokeless tobacco: Never    Tobacco comments:     smoked sometimes   Substance Use Topics    Alcohol use:  Yes     Alcohol/week: 7.0 standard drinks     Types: 7 Glasses of wine per week    Drug use: No        Allergies   Allergen Reactions    Bactrim Ds [Sulfamethoxazole-Trimethoprim] Other (See Comments)     Very bad headache   ,   Past Medical History:   Diagnosis Date    CAD (coronary artery disease) 11/12    per CT cardiac [id number]    Carpal tunnel syndrome on left 09/2020    Colon polyp 2004    Compression fracture of L2 ([address] 11/2017    DDD (degenerative disc disease), lumbar     L2-3; L3-4, L4-5, L5-S1    Hyperlipidemia     Hypothyroidism     Lateral meniscal tear 12/15    Osteoarthritis of right knee 12/15    Osteopenia     per DEXA       PHYSICAL EXAMINATION:  [ INSTRUCTIONS:  \"[x]\" Indicates a positive item  \"[]\" Indicates a negative item  -- DELETE ALL ITEMS NOT EXAMINED]  Vital Signs: (As obtained by patient/caregiver or practitioner observation)    Blood pressure-  Heart rate-    Respiratory rate-    Temperature-  Pulse oximetry-   Wt 118 lb  Wt Readings from Last 3 Encounters:   07/01/22 120 lb (54.4 kg)   06/06/22 120 lb 6.4 oz (54.6 kg)   09/20/21 118 lb 12.8 oz (53.9 kg)     Temp Readings from Last 3 Encounters:   06/06/22 96.8 °F (36 °C)   09/20/21 97 °F (36.1 °C)   01/05/21 97.2 °F (36.2 °C)     BP Readings from Last 3 Encounters:   07/01/22 131/81   06/06/22 120/80   09/20/21 100/70     Pulse Readings from Last 3 Encounters:   07/01/22 59   06/06/22 70   09/20/21 70      Constitutional: [x] Appears well-developed and well-nourished [x] No apparent distress      [] Abnormal-   Mental status  [x] Alert and awake  [x] Oriented to person/place/time [x]Able to follow commands      Eyes:  EOM    [x]  Normal  [] Abnormal-  Sclera  [x]  Normal  [] Abnormal -         Discharge [x]  None visible  [] Abnormal -    HENT:   [x] Normocephalic, atraumatic.   [] Abnormal       Neck: [x] No visualized mass     Pulmonary/Chest: [x] Respiratory effort normal.  [x] No visualized signs of difficulty breathing or respiratory distress        [] Abnormal-         Neurological:        [x] No Facial Asymmetry (Cranial nerve 7 motor function) (limited exam to video visit)         Skin:        [x] No significant exanthematous lesions or discoloration noted on facial skin         [] Abnormal-            Psychiatric:       [x] Normal Affect [x] No Hallucinations        [] Abnormal-     Other pertinent observable physical exam findings-     ASSESSMENT/PLAN:  1. COVID-19 virus infection  Paxlovid discussed and declined. Advised regarding isolation minimum 5 days and until no fever for 24 hours then mask and social distance another 5 days. Call or return to clinic prn if these symptoms worsen or fail to improve as anticipated. - benzonatate (TESSALON) 200 MG capsule; Take 1 capsule by mouth 3 times daily as needed for Cough  Dispense: 30 capsule; Refill: 0    No follow-ups on file. Reyna Whelan, was evaluated through a synchronous (real-time) audio-video encounter. The patient (or guardian if applicable) is aware that this is a billable service, which includes applicable co-pays. This Virtual Visit was conducted with patient's (and/or legal guardian's) consent. The visit was conducted pursuant to the emergency declaration under the [address] [place] [address], [address] [place] [address] authority and the [employer] and [employer] Act. Patient identification was verified, and a caregiver was present when appropriate. The patient was located at Home: [address] [place] [address]. Provider was located at Home ([place] [address]): [place]. Total time spent on this encounter: Not billed by time    --Rosi Alatorre MD on 7/21/2022 at 11:49 AM    An electronic signature was used to authenticate this note.

## 2022-09-08 ENCOUNTER — OFFICE VISIT (OUTPATIENT)
Dept: FAMILY MEDICINE CLINIC | Age: 77
End: 2022-09-08
Payer: MEDICARE

## 2022-09-08 VITALS
DIASTOLIC BLOOD PRESSURE: 68 MMHG | WEIGHT: 120 LBS | TEMPERATURE: 97.5 F | HEIGHT: 61 IN | RESPIRATION RATE: 18 BRPM | HEART RATE: 65 BPM | OXYGEN SATURATION: 98 % | SYSTOLIC BLOOD PRESSURE: 120 MMHG | BODY MASS INDEX: 22.66 KG/M2

## 2022-09-08 DIAGNOSIS — M17.11 PRIMARY OSTEOARTHRITIS OF RIGHT KNEE: ICD-10-CM

## 2022-09-08 DIAGNOSIS — M51.36 DDD (DEGENERATIVE DISC DISEASE), LUMBAR: ICD-10-CM

## 2022-09-08 DIAGNOSIS — I25.10 CORONARY ARTERY DISEASE INVOLVING NATIVE HEART WITHOUT ANGINA PECTORIS, UNSPECIFIED VESSEL OR LESION TYPE: ICD-10-CM

## 2022-09-08 DIAGNOSIS — Z23 NEED FOR INFLUENZA VACCINATION: ICD-10-CM

## 2022-09-08 DIAGNOSIS — Z12.31 ENCOUNTER FOR SCREENING MAMMOGRAM FOR MALIGNANT NEOPLASM OF BREAST: ICD-10-CM

## 2022-09-08 DIAGNOSIS — E78.00 PURE HYPERCHOLESTEROLEMIA: ICD-10-CM

## 2022-09-08 DIAGNOSIS — E03.9 HYPOTHYROIDISM, UNSPECIFIED TYPE: ICD-10-CM

## 2022-09-08 DIAGNOSIS — Z00.00 ROUTINE GENERAL MEDICAL EXAMINATION AT A HEALTH CARE FACILITY: Primary | ICD-10-CM

## 2022-09-08 PROCEDURE — G8427 DOCREV CUR MEDS BY ELIG CLIN: HCPCS | Performed by: FAMILY MEDICINE

## 2022-09-08 PROCEDURE — G0439 PPPS, SUBSEQ VISIT: HCPCS | Performed by: FAMILY MEDICINE

## 2022-09-08 PROCEDURE — G8399 PT W/DXA RESULTS DOCUMENT: HCPCS | Performed by: FAMILY MEDICINE

## 2022-09-08 PROCEDURE — G0008 ADMIN INFLUENZA VIRUS VAC: HCPCS | Performed by: FAMILY MEDICINE

## 2022-09-08 PROCEDURE — 1036F TOBACCO NON-USER: CPT | Performed by: FAMILY MEDICINE

## 2022-09-08 PROCEDURE — G8420 CALC BMI NORM PARAMETERS: HCPCS | Performed by: FAMILY MEDICINE

## 2022-09-08 PROCEDURE — 90694 VACC AIIV4 NO PRSRV 0.5ML IM: CPT | Performed by: FAMILY MEDICINE

## 2022-09-08 PROCEDURE — 99213 OFFICE O/P EST LOW 20 MIN: CPT | Performed by: FAMILY MEDICINE

## 2022-09-08 PROCEDURE — 1090F PRES/ABSN URINE INCON ASSESS: CPT | Performed by: FAMILY MEDICINE

## 2022-09-08 PROCEDURE — 1123F ACP DISCUSS/DSCN MKR DOCD: CPT | Performed by: FAMILY MEDICINE

## 2022-09-08 RX ORDER — LEVOTHYROXINE SODIUM 0.07 MG/1
75 TABLET ORAL DAILY
Qty: 90 TABLET | Refills: 0 | Status: SHIPPED | OUTPATIENT
Start: 2022-09-08

## 2022-09-08 SDOH — ECONOMIC STABILITY: FOOD INSECURITY: WITHIN THE PAST 12 MONTHS, YOU WORRIED THAT YOUR FOOD WOULD RUN OUT BEFORE YOU GOT MONEY TO BUY MORE.: NEVER TRUE

## 2022-09-08 SDOH — ECONOMIC STABILITY: FOOD INSECURITY: WITHIN THE PAST 12 MONTHS, THE FOOD YOU BOUGHT JUST DIDN'T LAST AND YOU DIDN'T HAVE MONEY TO GET MORE.: NEVER TRUE

## 2022-09-08 ASSESSMENT — LIFESTYLE VARIABLES
HOW OFTEN DO YOU HAVE A DRINK CONTAINING ALCOHOL: 4 OR MORE TIMES A WEEK
HOW OFTEN DURING THE LAST YEAR HAVE YOU FOUND THAT YOU WERE NOT ABLE TO STOP DRINKING ONCE YOU HAD STARTED: 0
HOW OFTEN DURING THE LAST YEAR HAVE YOU HAD A FEELING OF GUILT OR REMORSE AFTER DRINKING: 0
HOW OFTEN DURING THE LAST YEAR HAVE YOU FAILED TO DO WHAT WAS NORMALLY EXPECTED FROM YOU BECAUSE OF DRINKING: 0
HOW OFTEN DURING THE LAST YEAR HAVE YOU BEEN UNABLE TO REMEMBER WHAT HAPPENED THE NIGHT BEFORE BECAUSE YOU HAD BEEN DRINKING: 0
HOW MANY STANDARD DRINKS CONTAINING ALCOHOL DO YOU HAVE ON A TYPICAL DAY: 1 OR 2
HAS A RELATIVE, FRIEND, DOCTOR, OR ANOTHER HEALTH PROFESSIONAL EXPRESSED CONCERN ABOUT YOUR DRINKING OR SUGGESTED YOU CUT DOWN: 0
HOW OFTEN DURING THE LAST YEAR HAVE YOU NEEDED AN ALCOHOLIC DRINK FIRST THING IN THE MORNING TO GET YOURSELF GOING AFTER A NIGHT OF HEAVY DRINKING: 0
HAVE YOU OR SOMEONE ELSE BEEN INJURED AS A RESULT OF YOUR DRINKING: 0

## 2022-09-08 ASSESSMENT — SOCIAL DETERMINANTS OF HEALTH (SDOH): HOW HARD IS IT FOR YOU TO PAY FOR THE VERY BASICS LIKE FOOD, HOUSING, MEDICAL CARE, AND HEATING?: NOT HARD AT ALL

## 2022-09-08 ASSESSMENT — PATIENT HEALTH QUESTIONNAIRE - PHQ9
SUM OF ALL RESPONSES TO PHQ QUESTIONS 1-9: 0
2. FEELING DOWN, DEPRESSED OR HOPELESS: 0
1. LITTLE INTEREST OR PLEASURE IN DOING THINGS: 0
SUM OF ALL RESPONSES TO PHQ9 QUESTIONS 1 & 2: 0
SUM OF ALL RESPONSES TO PHQ QUESTIONS 1-9: 0

## 2022-09-08 NOTE — PROGRESS NOTES
Medicare Annual Wellness Visit    Elvia Guadalupe is here for Medicare AW    Assessment & Plan   1. Routine general medical examination at a health care facility  - Comprehensive Metabolic Panel; Future  - Lipid Panel; Future  - CBC; Future    2. Hypothyroidism, unspecified type  - Stable. Continue Synthroid 0.075 mg qd. - levothyroxine (SYNTHROID) 75 MCG tablet; Take 1 tablet by mouth Daily  Dispense: 90 tablet; Refill: 0  - TSH; Future  - T4, Free; Future    3. Pure hypercholesterolemia  - Controlled. Continue Lipitor 20 mg qd and low cholesterol diet. - Comprehensive Metabolic Panel; Future  - Lipid Panel; Future    4. Primary osteoarthritis of right knee  - Moist heat . ROM exercises. Modify activities. - Comprehensive Metabolic Panel; Future  - CBC; Future    5. DDD (degenerative disc disease), lumbar  - Moist heat . ROM exercises. Modify activities. - CBC; Future    6. Coronary artery disease involving native heart without angina pectoris, unspecified vessel or lesion type  - Stable. Continue Aspirin 81 mg qd and Lipitor 20 mg qd.     7. Need for influenza vaccination  - Influenza, FLUAD, (age 72 y+), IM, Preservative Free, 0.5 mL    8. Encounter for screening mammogram for malignant neoplasm of breast  - JESSICA DIGITAL SCREEN W OR WO CAD BILATERAL; Future and follow up after completed/ prn. Recommendations for Preventive Services Due: see orders and patient instructions/AVS.  Recommended screening schedule for the next 5-10 years is provided to the patient in written form: see Patient Instructions/AVS.     Follow up 6 months/ prn. Subjective   The following acute and/or chronic problems were also addressed today:    Denies fever, chest pain , shortness of breath or cough. Dexa: 1/21-[facility];  9/14 (no sig.  Change); 10/6/2010   mammo : 12/20; 9/18; 7/17; 9/15; 8/14; 12/11   Pap smear 11/13- Akash Blocker- GYN   Colonoscopy : 8/18- Dr. Sergio Viveros - 8/23; 4/23/13 -Dr. Mignon Lane [facility] Risk Assessment and screening values have been reviewed and are found in Flowsheets. The following problems were reviewed today and where indicated follow up appointments were made and/or referrals ordered. Positive Risk Factor Screenings with Interventions:             General Health and ACP:  General  In general, how would you say your health is?: Very Good  In the past 7 days, have you experienced any of the following: New or Increased Pain, New or Increased Fatigue, Loneliness, Social Isolation, Stress or Anger?: No  Do you get the social and emotional support that you need?: Yes  Do you have a Living Will?: Yes    Advance Directives       Power of  Living Will ACP-Advance Directive ACP-Power of     Not on File Not on File Not on File Not on File        General Health Risk Interventions:  Fatigue: regular exercise recommended- 3-5 times per week, 30-45 minutes per session              Objective   Vitals:    09/08/22 0910   BP: 120/68   Pulse: 65   Resp: 18   Temp: 97.5 °F (36.4 °C)   SpO2: 98%   Weight: 120 lb (54.4 kg)   Height: 5' 0.63\" (1.54 m)      Body mass index is 22.95 kg/m².       General Appearance: alert and oriented to person, place and time, well developed and well- nourished, in no acute distress  Skin: warm and dry, no rash or erythema  Head: normocephalic and atraumatic  Eyes: pupils equal, round, and reactive to light, extraocular eye movements intact, conjunctivae normal  ENT: tympanic membrane, external ear and ear canal normal bilaterally, nose without deformity, nasal mucosa and turbinates normal without polyps  Neck: supple and non-tender without mass, no thyromegaly or thyroid nodules, no cervical lymphadenopathy  Pulmonary/Chest: clear to auscultation bilaterally- no wheezes, rales or rhonchi, normal air movement, no respiratory distress  Cardiovascular: normal rate, regular rhythm, normal S1 and S2, no murmurs, rubs, clicks, or gallops, distal pulses intact, no carotid bruits  Abdomen: soft, non-tender, non-distended, normal bowel sounds, no masses or organomegaly  Extremities: no cyanosis, clubbing or edema  Musculoskeletal: normal range of motion, no joint swelling, deformity or tenderness  Neurologic: reflexes normal and symmetric, no cranial nerve deficit, gait, coordination and speech normal       Allergies   Allergen Reactions    Bactrim Ds [Sulfamethoxazole-Trimethoprim] Other (See Comments)     Very bad headache     Prior to Visit Medications    Medication Sig Taking? Authorizing Provider   calcium carbonate (OSCAL) 500 MG TABS tablet Take 500 mg by mouth daily Yes Historical Provider, MD   Biotin 10 MG tablet Take by mouth Yes Historical Provider, MD   atorvastatin (LIPITOR) 20 MG tablet Take 1 tablet by mouth daily FOLLOW UP APPOINTMENT AND LABS ARE NEEDED. Yes Sánchez Adames MD   levothyroxine (SYNTHROID) 75 MCG tablet Take 1 tablet by mouth Daily Yes Sánchez Adames MD   Ascorbic Acid (VITAMIN C) 500 MG tablet Take 1,000 mg by mouth daily. Yes Historical Provider, MD   MULTIPLE VITAMIN PO Take  by mouth. Yes Historical Provider, MD   Coenzyme Q-10 100 MG CAPS Take  by mouth.  Yes Historical Provider, MD   zinc 50 MG CAPS Take by mouth  Historical Provider, MD Bill (Including outside providers/suppliers regularly involved in providing care):   Patient Care Team:  Sánchez Adames MD as PCP - Adrianna Coughlin MD as PCP - [facility] Empaneled Provider     Reviewed and updated this visit:  Tobacco  Allergies  Meds  Problems  Med Hx  Surg Hx  Soc Hx  Fam Hx

## 2022-09-12 DIAGNOSIS — E78.00 PURE HYPERCHOLESTEROLEMIA: ICD-10-CM

## 2022-09-12 DIAGNOSIS — M17.11 PRIMARY OSTEOARTHRITIS OF RIGHT KNEE: ICD-10-CM

## 2022-09-12 DIAGNOSIS — Z00.00 ROUTINE GENERAL MEDICAL EXAMINATION AT A HEALTH CARE FACILITY: ICD-10-CM

## 2022-09-12 DIAGNOSIS — M51.36 DDD (DEGENERATIVE DISC DISEASE), LUMBAR: ICD-10-CM

## 2022-09-12 DIAGNOSIS — E03.9 HYPOTHYROIDISM, UNSPECIFIED TYPE: ICD-10-CM

## 2022-09-12 LAB
A/G RATIO: 1.8 (ref 1.1–2.2)
ALBUMIN SERPL-MCNC: 4.2 G/DL (ref 3.4–5)
ALP BLD-CCNC: 77 U/L (ref 40–129)
ALT SERPL-CCNC: 22 U/L (ref 10–40)
ANION GAP SERPL CALCULATED.3IONS-SCNC: 10 MMOL/L (ref 3–16)
AST SERPL-CCNC: 24 U/L (ref 15–37)
BILIRUB SERPL-MCNC: 0.5 MG/DL (ref 0–1)
BUN BLDV-MCNC: 15 MG/DL (ref 7–20)
CALCIUM SERPL-MCNC: 9.5 MG/DL (ref 8.3–10.6)
CHLORIDE BLD-SCNC: 99 MMOL/L (ref 99–110)
CHOLESTEROL, TOTAL: 181 MG/DL (ref 0–199)
CO2: 26 MMOL/L (ref 21–32)
CREAT SERPL-MCNC: 0.6 MG/DL (ref 0.6–1.2)
GFR AFRICAN AMERICAN: >60
GFR NON-AFRICAN AMERICAN: >60
GLUCOSE BLD-MCNC: 89 MG/DL (ref 70–99)
HCT VFR BLD CALC: 38.5 % (ref 36–48)
HDLC SERPL-MCNC: 62 MG/DL (ref 40–60)
HEMOGLOBIN: 12.9 G/DL (ref 12–16)
LDL CHOLESTEROL CALCULATED: 104 MG/DL
MCH RBC QN AUTO: 28.5 PG (ref 26–34)
MCHC RBC AUTO-ENTMCNC: 33.5 G/DL (ref 31–36)
MCV RBC AUTO: 85 FL (ref 80–100)
PDW BLD-RTO: 14.5 % (ref 12.4–15.4)
PLATELET # BLD: 196 K/UL (ref 135–450)
PMV BLD AUTO: 8.1 FL (ref 5–10.5)
POTASSIUM SERPL-SCNC: 5.3 MMOL/L (ref 3.5–5.1)
RBC # BLD: 4.53 M/UL (ref 4–5.2)
SODIUM BLD-SCNC: 135 MMOL/L (ref 136–145)
T4 FREE: 1.5 NG/DL (ref 0.9–1.8)
TOTAL PROTEIN: 6.5 G/DL (ref 6.4–8.2)
TRIGL SERPL-MCNC: 74 MG/DL (ref 0–150)
TSH SERPL DL<=0.05 MIU/L-ACNC: 0.8 UIU/ML (ref 0.27–4.2)
VLDLC SERPL CALC-MCNC: 15 MG/DL
WBC # BLD: 5 K/UL (ref 4–11)

## 2022-10-05 ENCOUNTER — HOSPITAL ENCOUNTER (OUTPATIENT)
Dept: MAMMOGRAPHY | Age: 77
Discharge: HOME | End: 2022-10-05
Payer: MEDICARE

## 2022-10-05 VITALS — HEIGHT: 60 IN | WEIGHT: 120 LBS | BODY MASS INDEX: 23.56 KG/M2

## 2022-10-05 DIAGNOSIS — Z12.31 ENCOUNTER FOR SCREENING MAMMOGRAM FOR MALIGNANT NEOPLASM OF BREAST: ICD-10-CM

## 2022-10-05 PROCEDURE — 77063 BREAST TOMOSYNTHESIS BI: CPT

## 2022-12-06 DIAGNOSIS — E03.9 HYPOTHYROIDISM, UNSPECIFIED TYPE: ICD-10-CM

## 2022-12-06 RX ORDER — LEVOTHYROXINE SODIUM 0.07 MG/1
75 TABLET ORAL DAILY
Qty: 90 TABLET | Refills: 2 | Status: SHIPPED | OUTPATIENT
Start: 2022-12-06

## 2023-01-09 ENCOUNTER — PATIENT MESSAGE (OUTPATIENT)
Dept: FAMILY MEDICINE CLINIC | Age: 78
End: 2023-01-09

## 2023-01-10 ENCOUNTER — TELEMEDICINE (OUTPATIENT)
Dept: FAMILY MEDICINE CLINIC | Age: 78
End: 2023-01-10
Payer: MEDICARE

## 2023-01-10 DIAGNOSIS — J01.00 ACUTE MAXILLARY SINUSITIS, RECURRENCE NOT SPECIFIED: ICD-10-CM

## 2023-01-10 PROCEDURE — 1090F PRES/ABSN URINE INCON ASSESS: CPT | Performed by: FAMILY MEDICINE

## 2023-01-10 PROCEDURE — 99213 OFFICE O/P EST LOW 20 MIN: CPT | Performed by: FAMILY MEDICINE

## 2023-01-10 PROCEDURE — G8427 DOCREV CUR MEDS BY ELIG CLIN: HCPCS | Performed by: FAMILY MEDICINE

## 2023-01-10 PROCEDURE — 1123F ACP DISCUSS/DSCN MKR DOCD: CPT | Performed by: FAMILY MEDICINE

## 2023-01-10 PROCEDURE — G8399 PT W/DXA RESULTS DOCUMENT: HCPCS | Performed by: FAMILY MEDICINE

## 2023-01-10 RX ORDER — AMOXICILLIN AND CLAVULANATE POTASSIUM 875; 125 MG/1; MG/1
1 TABLET, FILM COATED ORAL 2 TIMES DAILY
Qty: 20 TABLET | Refills: 0 | Status: SHIPPED | OUTPATIENT
Start: 2023-01-10 | End: 2023-01-20

## 2023-01-10 ASSESSMENT — PATIENT HEALTH QUESTIONNAIRE - PHQ9
SUM OF ALL RESPONSES TO PHQ QUESTIONS 1-9: 0
1. LITTLE INTEREST OR PLEASURE IN DOING THINGS: 0
SUM OF ALL RESPONSES TO PHQ9 QUESTIONS 1 & 2: 0
SUM OF ALL RESPONSES TO PHQ QUESTIONS 1-9: 0
SUM OF ALL RESPONSES TO PHQ QUESTIONS 1-9: 0
2. FEELING DOWN, DEPRESSED OR HOPELESS: 0
SUM OF ALL RESPONSES TO PHQ QUESTIONS 1-9: 0

## 2023-01-10 NOTE — PROGRESS NOTES
1/10/2023    TELEHEALTH EVALUATION -- Audio/Visual (During [facility] public health emergency)    Due to Sherron 19 outbreak, patient's office visit was converted to a virtual visit. Patient was contacted and agreed to proceed with a virtual visit via [email or web address]  The risks and benefits of converting to a virtual visit were discussed in light of the current infectious disease epidemic. Patient also understood that insurance coverage and co-pays are up to their individual insurance plans. HPI:    Nadiya Rowe (:  1945) has requested an audio/video evaluation for the following concern(s):    Nasal congestion and post nasal drip X 2 + weeks . Headache and fatigue. She started feeling better, but now has productive cough of yellow sputum. Nasal discharge is clear usually . Some headaches - frontal. Ears are clogged. She had a fever initially for first 1-2 days with chills. Negative COVID no shortness of breath or wheezing now, but had some wheezing earlier on. No nausea, vomiting. Looses stools occasionally . Irritated throat. Using a Netti pot. Review of Systems    Prior to Visit Medications    Medication Sig Taking? Authorizing Provider   levothyroxine (SYNTHROID) 75 MCG tablet Take 1 tablet by mouth Daily Yes Adrian Ellington MD   calcium carbonate (OSCAL) 500 MG TABS tablet Take 500 mg by mouth daily Yes Historical Provider, MD   Biotin 10 MG tablet Take by mouth Yes Historical Provider, MD   atorvastatin (LIPITOR) 20 MG tablet Take 1 tablet by mouth daily FOLLOW UP APPOINTMENT AND LABS ARE NEEDED. Yes Adrian Ellington MD   zinc 50 MG CAPS Take by mouth Yes Historical Provider, MD   Ascorbic Acid (VITAMIN C) 500 MG tablet Take 1,000 mg by mouth daily. Yes Historical Provider, MD   MULTIPLE VITAMIN PO Take  by mouth. Yes Historical Provider, MD   Coenzyme Q-10 100 MG CAPS Take  by mouth.  Yes Historical Provider, MD       Allergies   Allergen Reactions    Bactrim Ds [Sulfamethoxazole-Trimethoprim] Other (See Comments)     Very bad headache       Social History     Tobacco Use    Smoking status: Former     Packs/day: 0.05     Years: 14.00     Pack years: 0.70     Types: Cigarettes     Quit date: 1976     Years since quittin.0    Smokeless tobacco: Never    Tobacco comments:     smoked sometimes   Substance Use Topics    Alcohol use:  Yes     Alcohol/week: 7.0 standard drinks     Types: 7 Glasses of wine per week    Drug use: No        Past Medical History:   Diagnosis Date    CAD (coronary artery disease)     per CT cardiac lqnsx=119    Carpal tunnel syndrome on left 2020    Colon polyp 2004    Compression fracture of L2 ([facility] 2017    DDD (degenerative disc disease), lumbar     L2-3; L3-4, L4-5, L5-S1    Hyperlipidemia     Hypothyroidism     Lateral meniscal tear 12/15    Osteoarthritis of right knee 12/15    Osteopenia     per DEXA       Past Surgical History:   Procedure Laterality Date    CARPAL TUNNEL RELEASE  2019    COLONOSCOPY      KNEE ARTHROSCOPY Right 3/31/2016    RIGHT KNEE ARTHROSCOPY WITH PARTIAL LATERAL MENISCECTOMY    THYROID SURGERY  3/13    right- biopsy       Health Maintenance   Topic Date Due    DTaP/Tdap/Td vaccine (2 - Td or Tdap) 2017    COVID-19 Vaccine (4 - Booster for Moderna series) 2022    Depression Screen  2023    Annual Wellness Visit (AWV)  2023    Lipids  2023    DEXA (modify frequency per FRAX score)  Completed    Flu vaccine  Completed    Shingles vaccine  Completed    Pneumococcal 65+ years Vaccine  Completed    Hepatitis C screen  Completed    Hepatitis A vaccine  Aged Out    Hib vaccine  Aged Out    Meningococcal (ACWY) vaccine  Aged Out       Family History   Problem Relation Age of Onset    Ovarian Cancer Mother     Colon Cancer Mother 71    Other Mother         brain tumor - ? CA    Cancer Mother         colon    Heart Disease Father     Diabetes Father     High Blood Pressure Father     Mult Sclerosis Sister Arthritis Brother     Breast Cancer Neg Hx        PHYSICAL EXAMINATION:    Vital Signs: (As obtained by patient/caregiver or practitioner observation)     Patient-Reported Vitals 1/10/2023   Patient-Reported Weight 120   Patient-Reported Height 5'0   Patient-Reported Temperature -      Heart rate= 80  Respiratory rate= 14 Temperature= 98      Constitutional:  Appears well-developed and well-nourished. No apparent distress                              Mental status:  Alert and awake. Oriented to person/place/time. Able to follow commands       Eyes: EOM intact. Sclera-normal. No erythema of conjunctiva. No eye discharge. HENT: Normocephalic, atraumatic. Mouth/Throat: normal. Mucous membranes are moist.      External Ears: Normal       Neck: No visualized mass      Pulmonary/Chest:  Respiratory effort normal.  No visualized signs of difficulty breathing or respiratory distress         Musculoskeletal:   Normal gait with no signs of ataxia. Normal range of motion of neck. Neurological:         No Facial Asymmetry (Cranial nerve 7 motor function) (limited exam to video visit) . No gaze palsy              Skin:                     No significant exanthematous lesions or discoloration noted on facial skin                                        Psychiatric:          Normal Affect. No Hallucinations           Other pertinent observable physical exam findings:       ASSESSMENT/PLAN:  1. Acute maxillary sinusitis, recurrence not specified  - Push fluids - water. Netti pot prn.    - amoxicillin-clavulanate (AUGMENTIN) 875-125 MG per tablet; Take 1 tablet by mouth 2 times daily for 10 days  Dispense: 20 tablet; Refill: 0    F/u if no improvement 7-10d/ prn increased symptoms. An  electronic signature was used to authenticate this note.     --Branden Baker MD on 1/10/2023 at 10:00 AM      Pursuant to the emergency declaration under the [address], [address] [place] [address] and the Coronavirus Preparedness and Response Supplemental Appropriations Act, this Virtual  Visit was conducted, with patient's consent, to reduce the patient's risk of exposure to COVID-19 and provide continuity of care for an established patient. Services were provided through a video synchronous discussion virtually to substitute for in-person clinic visit.

## 2023-05-10 ENCOUNTER — OFFICE VISIT (OUTPATIENT)
Dept: ENT CLINIC | Age: 78
End: 2023-05-10

## 2023-05-10 VITALS
HEART RATE: 65 BPM | HEIGHT: 60 IN | TEMPERATURE: 97.8 F | WEIGHT: 120 LBS | BODY MASS INDEX: 23.56 KG/M2 | DIASTOLIC BLOOD PRESSURE: 74 MMHG | SYSTOLIC BLOOD PRESSURE: 120 MMHG | OXYGEN SATURATION: 95 %

## 2023-05-10 DIAGNOSIS — D49.0 NEOPLASM OF BUCCAL MUCOSA: ICD-10-CM

## 2023-05-10 DIAGNOSIS — K13.21 LEUKOPLAKIA ORAL MUCOSA: Primary | ICD-10-CM

## 2023-05-10 ASSESSMENT — ENCOUNTER SYMPTOMS
SORE THROAT: 0
VOICE CHANGE: 0
RHINORRHEA: 0
SINUS PRESSURE: 0
EYE REDNESS: 0
CHOKING: 0
FACIAL SWELLING: 0
SHORTNESS OF BREATH: 0
SINUS PAIN: 0
COLOR CHANGE: 0
STRIDOR: 0
NAUSEA: 0
DIARRHEA: 0
COUGH: 0
TROUBLE SWALLOWING: 0
EYE ITCHING: 0
PHOTOPHOBIA: 0
EYE PAIN: 0

## 2023-05-10 NOTE — PROGRESS NOTES
[facility]  [address], [address] [facility], [address]  P: [phone]  F: [phone]       Patient     Tyron Cushing  1945    ChiefComplaint     Chief Complaint   Patient presents with    Follow-up     Patient is here today for her follow up on the lesions in her mouth, she still has remains of the lesions       History of Present Illness     Tyron Cushing is a pleasant 77 y.o. female here for follow-up for right buccal mucosa lesion. Previous biopsy revealed mild dysplasia. She is concerned as she feels like it seems to be getting bigger. She does wear a bite guard and thinks that it may be rubbing on the buccal mucosa in this location. Additionally she thinks her tooth may be rubbing in the location. She did have a relative recently who had a precancerous lesion of the mouth and this is made her more concerned. Has not been bleeding or ulcerating. She is interested in a repeat biopsy today.     Past Medical History     Past Medical History:   Diagnosis Date    CAD (coronary artery disease) 11/12    per CT cardiac qprys=069    Carpal tunnel syndrome on left 09/2020    Colon polyp 2004    Compression fracture of L2 ([address] 11/2017    DDD (degenerative disc disease), lumbar     L2-3; L3-4, L4-5, L5-S1    Hyperlipidemia     Hypothyroidism     Lateral meniscal tear 12/15    Osteoarthritis of right knee 12/15    Osteopenia     per DEXA       Past Surgical History     Past Surgical History:   Procedure Laterality Date    CARPAL TUNNEL RELEASE  11/2019    COLONOSCOPY      KNEE ARTHROSCOPY Right 3/31/2016    RIGHT KNEE ARTHROSCOPY WITH PARTIAL LATERAL MENISCECTOMY    THYROID SURGERY  3/13    right- biopsy       Family History     Family History   Problem Relation Age of Onset    Ovarian Cancer Mother     Colon Cancer Mother 71    Other Mother         brain tumor - ? CA    Cancer Mother         colon    Heart Disease Father     Diabetes Father     High Blood Pressure Father     Mult

## 2023-05-15 ENCOUNTER — TELEPHONE (OUTPATIENT)
Dept: ENT CLINIC | Age: 78
End: 2023-05-15

## 2023-05-15 NOTE — TELEPHONE ENCOUNTER
----- Message from Joleen Joy DO sent at 5/15/2023 12:13 PM EDT -----  Biopsy shows inflammation and scarring. No concern for cancer. No dysplasia (precancer). Follow up to monitor in one year.  Call with any quesitons or concerns

## 2023-08-28 DIAGNOSIS — E03.9 HYPOTHYROIDISM, UNSPECIFIED TYPE: ICD-10-CM

## 2023-08-28 RX ORDER — LEVOTHYROXINE SODIUM 0.07 MG/1
75 TABLET ORAL DAILY
Qty: 90 TABLET | Refills: 2 | OUTPATIENT
Start: 2023-08-28

## 2023-10-02 ENCOUNTER — OFFICE VISIT (OUTPATIENT)
Dept: DERMATOLOGY | Age: 78
End: 2023-10-02
Payer: MEDICARE

## 2023-10-02 DIAGNOSIS — K13.29 WHITE PATCHES ON ORAL MUCOSA: ICD-10-CM

## 2023-10-02 DIAGNOSIS — L82.1 SEBORRHEIC KERATOSIS: ICD-10-CM

## 2023-10-02 DIAGNOSIS — L57.0 AK (ACTINIC KERATOSIS): ICD-10-CM

## 2023-10-02 DIAGNOSIS — D22.9 MULTIPLE NEVI: Primary | ICD-10-CM

## 2023-10-02 DIAGNOSIS — L81.4 LENTIGINES: ICD-10-CM

## 2023-10-02 PROCEDURE — 99213 OFFICE O/P EST LOW 20 MIN: CPT | Performed by: DERMATOLOGY

## 2023-10-02 PROCEDURE — G8420 CALC BMI NORM PARAMETERS: HCPCS | Performed by: DERMATOLOGY

## 2023-10-02 PROCEDURE — 1036F TOBACCO NON-USER: CPT | Performed by: DERMATOLOGY

## 2023-10-02 PROCEDURE — G8427 DOCREV CUR MEDS BY ELIG CLIN: HCPCS | Performed by: DERMATOLOGY

## 2023-10-02 PROCEDURE — G8399 PT W/DXA RESULTS DOCUMENT: HCPCS | Performed by: DERMATOLOGY

## 2023-10-02 PROCEDURE — 1090F PRES/ABSN URINE INCON ASSESS: CPT | Performed by: DERMATOLOGY

## 2023-10-02 PROCEDURE — G8484 FLU IMMUNIZE NO ADMIN: HCPCS | Performed by: DERMATOLOGY

## 2023-10-02 PROCEDURE — 1123F ACP DISCUSS/DSCN MKR DOCD: CPT | Performed by: DERMATOLOGY

## 2023-10-02 NOTE — PATIENT INSTRUCTIONS
Tretinoin / Differin (Adapalene) / Tazorac (Tazarotene)    Your physician has prescribed a topical medication that affects the growth and turnover of skin cells. We are providing you with the following information so that you understand how the medication should be used and potential side effects you may experience. Wash your face with mild soap and water, then allow to dry before applying the medication. Apply a pea-sized amount to your entire face. Applying more than this may result in more irritation. Use the minimum amount possible to areas such as the corners of the mouth, the angles of the nose and the eyelids. Common side effects include: burning/stinging, redness, dryness, peeling and itching. These side effects typically decrease with continued use of the medication. You may use moisturizing creams or lotions to help reduce these side effects. If you continue to experience these side effects, you may decrease your use of the medication to once every 2 or 3 days.          Elta MD UV Clear - daily moisturizer with SPF - for the AM  Manjit Posay - double repair facial moisturizer

## 2023-10-02 NOTE — PROGRESS NOTES
Medications and Allergies reviewed. Past Medical History:   Diagnosis Date    CAD (coronary artery disease) 11/12    per CT cardiac dntvi=324    Carpal tunnel syndrome on left 09/2020    Colon polyp 2004    Compression fracture of L2 ([address]) 11/2017    DDD (degenerative disc disease), lumbar     L2-3; L3-4, L4-5, L5-S1    Hyperlipidemia     Hypothyroidism     Lateral meniscal tear 12/15    Osteoarthritis of right knee 12/15    Osteopenia     per DEXA       Past Surgical History:   Procedure Laterality Date    CARPAL TUNNEL RELEASE  11/2019    COLONOSCOPY      KNEE ARTHROSCOPY Right 3/31/2016    RIGHT KNEE ARTHROSCOPY WITH PARTIAL LATERAL MENISCECTOMY    THYROID SURGERY  3/13    right- biopsy       Outpatient Medications Marked as Taking for the 10/2/23 encounter (Office Visit) with Marck Mcleod MD   Medication Sig Dispense Refill    levothyroxine (SYNTHROID) 75 MCG tablet Take 1 tablet by mouth Daily 90 tablet 2    calcium carbonate (OSCAL) 500 MG TABS tablet Take 1 tablet by mouth daily      Biotin 10 MG tablet Take by mouth      atorvastatin (LIPITOR) 20 MG tablet Take 1 tablet by mouth daily FOLLOW UP APPOINTMENT AND LABS ARE NEEDED. 30 tablet 0    zinc 50 MG CAPS Take by mouth      Ascorbic Acid (VITAMIN C) 500 MG tablet Take 2 tablets by mouth daily      MULTIPLE VITAMIN PO Take  by mouth. Coenzyme Q-10 100 MG CAPS Take  by mouth. Allergies   Allergen Reactions    Bactrim Ds [Sulfamethoxazole-Trimethoprim] Other (See Comments)     Very bad headache       Physical Examination     Gen, NAD  FSE today    trunk and extremities with scattered brown macules and papules   R perinasal area with telangiectatic 3-4 mm blanchable macule - stable  Cheeks and nose with few telangiectasias  Face with scattered tan and light brown macules - many faded/cleared with peel  No AK's  Lips/perioral clear  Buccal mucosa with minimal/very subtle whitish patch; tongue clear            Assessment and Plan     1.

## 2024-02-28 DIAGNOSIS — E03.9 HYPOTHYROIDISM, UNSPECIFIED TYPE: ICD-10-CM

## 2024-02-28 RX ORDER — LEVOTHYROXINE SODIUM 0.07 MG/1
75 TABLET ORAL DAILY
Qty: 90 TABLET | Refills: 1 | OUTPATIENT
Start: 2024-02-28

## 2024-08-05 ENCOUNTER — TELEPHONE (OUTPATIENT)
Dept: DERMATOLOGY | Age: 79
End: 2024-08-05

## 2024-08-05 NOTE — TELEPHONE ENCOUNTER
Patient wants to know what it would cost for IPL.  She has not had this done before.  [phone]

## 2024-08-06 NOTE — TELEPHONE ENCOUNTER
Returned patient's call.    Patient's eye doctor suggested IPL/radiofrequency for dry eyes.     Patient double checking to see if Dr. Lange could do the treatment?     Patient wanted me to check.

## 2024-08-07 NOTE — TELEPHONE ENCOUNTER
While I do see patients for IPL, it is for cosmetic reasons for sun damage/redness.  I don't have the eye shields or handpiece typically used to treat right up by the eye.      There are several eye doctors around that will do IPL for dry eye.  Who did her eye doctor recommend?  Is she seeing someone at [facility]?

## 2024-08-07 NOTE — TELEPHONE ENCOUNTER
Patient notified with message.     Patient had cataract surgery at [facility], end of May and early June (both eyes completed)    Patient's eye doctor Ceci Muñiz, would do treatment for dry eye.

## 2024-10-08 ENCOUNTER — OFFICE VISIT (OUTPATIENT)
Dept: DERMATOLOGY | Age: 79
End: 2024-10-08
Payer: MEDICARE

## 2024-10-08 DIAGNOSIS — L57.0 AK (ACTINIC KERATOSIS): ICD-10-CM

## 2024-10-08 DIAGNOSIS — K13.29 WHITE PATCHES ON ORAL MUCOSA: ICD-10-CM

## 2024-10-08 DIAGNOSIS — L81.4 LENTIGINES: ICD-10-CM

## 2024-10-08 DIAGNOSIS — D22.9 MULTIPLE NEVI: Primary | ICD-10-CM

## 2024-10-08 DIAGNOSIS — L82.1 SEBORRHEIC KERATOSIS: ICD-10-CM

## 2024-10-08 PROCEDURE — 1123F ACP DISCUSS/DSCN MKR DOCD: CPT | Performed by: DERMATOLOGY

## 2024-10-08 PROCEDURE — G8399 PT W/DXA RESULTS DOCUMENT: HCPCS | Performed by: DERMATOLOGY

## 2024-10-08 PROCEDURE — 1090F PRES/ABSN URINE INCON ASSESS: CPT | Performed by: DERMATOLOGY

## 2024-10-08 PROCEDURE — G8427 DOCREV CUR MEDS BY ELIG CLIN: HCPCS | Performed by: DERMATOLOGY

## 2024-10-08 PROCEDURE — G8484 FLU IMMUNIZE NO ADMIN: HCPCS | Performed by: DERMATOLOGY

## 2024-10-08 PROCEDURE — 99213 OFFICE O/P EST LOW 20 MIN: CPT | Performed by: DERMATOLOGY

## 2024-10-08 PROCEDURE — G8421 BMI NOT CALCULATED: HCPCS | Performed by: DERMATOLOGY

## 2024-10-08 PROCEDURE — 1036F TOBACCO NON-USER: CPT | Performed by: DERMATOLOGY

## 2024-10-08 NOTE — PROGRESS NOTES
[facility]  Nabila Lange MD  [phone]      Jazmin Myers  1945    79 y.o. female     Date of Visit: 10/8/2024    Chief Complaint: moles, lesions  Chief Complaint   Patient presents with    Skin Exam     Last seen:   *has a place in [place], [place]    History of Present Illness:    1. Here for evaluation of multiple asx pigmented lesions on the trunk and extremities, present for many years; no change in size/shape/color of any lesions; no bleeding lesions.    2. Scattered brown spots on the face - had one large lentigo removed with laser in the past and has multiple scattered small lesions that were treated with LN2 and VI peel in 3-2019 with significant improvement.  She denies other changing moles/lesions.    3. F/u AK - previously on the R cheek.  No new concerns.    4. Hx of whitish patches in the mouth.  No recent concerns.  Seen by ENT in the past.  Also reviewed notes from ENT - bx from oral mucosa - 7-2022 by ENT read as squamous mucosa with mild dysplasia.  See comment.    - Negative for high-grade dysplasia/malignancy.   COMMENT:   A PAS stain is performed and interpreted as negative for fungal elements.  Following with Dr. Benson -ENT    Repeat bx done 5-2023 - reactive squamous hyperplasia with parakeratosis, negative for malignancy/dysplasia.    Hx of eruption on the upper lip x few mos.  Started with tight feeling and then peeling followed by Papules, ? Blister, crusts and peeling patch on the central/L upper lip.  Took valtrex for ? HSV.  Also used mometasone with clearance.  Favored contact dermatitis over HSV.  No flares since.    Previously discussed:  Asx telangiectasias on both cheeks for several yrs; had 1 trx with Vbeam laser in past with focal improvement.    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     Review of Systems:  Gen: Feels well, good sense of health.  Skin: No changing moles or lesions.    Past Medical History, Family History,

## 2024-10-08 NOTE — PATIENT INSTRUCTIONS
Protecting Yourself From the Sun    Apply an over-the-counter broad spectrum water resistant sunscreen with an SPF of at least 30 to exposed areas of the skin. Don’t forget the ears and lips! Remember to reapply sunscreen about every 2 hours and after swimming or sweating.     Wear sun protective clothing. Swim shirts (aka. rash guards) are a great idea and negates the need to reapply sunscreen in those areas.     Seek the shade whenever possible especially between the hours of 10 am and 4 pm when the sun’s rays are the strongest.     Avoid tanning beds

## 2024-11-27 ENCOUNTER — PATIENT MESSAGE (OUTPATIENT)
Dept: DERMATOLOGY | Age: 79
End: 2024-11-27

## 2024-12-02 RX ORDER — TRETINOIN 0.5 MG/G
CREAM TOPICAL
Qty: 20 G | Refills: 4 | Status: SHIPPED | OUTPATIENT
Start: 2024-12-02 | End: 2025-01-01

## 2024-12-12 ENCOUNTER — PROCEDURE VISIT (OUTPATIENT)
Dept: DERMATOLOGY | Age: 79
End: 2024-12-12

## 2024-12-12 DIAGNOSIS — L81.4 LENTIGINES: Primary | ICD-10-CM

## 2024-12-12 PROCEDURE — 90000 NO LOS: CPT | Performed by: DERMATOLOGY

## 2024-12-12 NOTE — PROGRESS NOTES
atorvastatin (LIPITOR) 20 MG tablet Take 1 tablet by mouth daily FOLLOW UP APPOINTMENT AND LABS ARE NEEDED. 30 tablet 0    zinc 50 MG CAPS Take by mouth      Ascorbic Acid (VITAMIN C) 500 MG tablet Take 2 tablets by mouth daily      MULTIPLE VITAMIN PO Take  by mouth.      Coenzyme Q-10 100 MG CAPS Take  by mouth.         Allergies   Allergen Reactions    Bactrim Ds [Sulfamethoxazole-Trimethoprim] Other (See Comments)     Very bad headache       Physical Examination     Cheeks with multiple tan macules and scattered telangiectasias      Assessment and Plan     Lentigines + telangiectasias  Decided on no trx for today given other circumstances/family .  Rtn next month for IPL/PDL for lentigines + telangiectasias  - 300-350/trx x 2    No charge.

## 2024-12-26 ENCOUNTER — TELEPHONE (OUTPATIENT)
Age: 79
End: 2024-12-26

## 2024-12-26 NOTE — TELEPHONE ENCOUNTER
Pt cancel her  procedure for 01-.at 1:00 .She will be oot.,and would like you to call her to reschedule the procedure.                [phone]

## 2024-12-30 NOTE — TELEPHONE ENCOUNTER
Patient will be called with availability for February 13th laser for IPL.    Patient's eye surgery was completed and her sister passed away.

## 2025-02-12 ENCOUNTER — PROCEDURE VISIT (OUTPATIENT)
Age: 80
End: 2025-02-12

## 2025-02-12 DIAGNOSIS — I78.1 TELANGIECTASIAS: ICD-10-CM

## 2025-02-12 DIAGNOSIS — L81.4 LENTIGINES: Primary | ICD-10-CM

## 2025-02-12 NOTE — PATIENT INSTRUCTIONS
Redness laser:  Following the procedure, the treated areas may be red or appear bruised and will likely be swollen for a few hours or up to a few days.  The affected areas should be treated delicately following treatment.  Discomfort and swelling should be treated with the application of hourly cool compresses the evening of the procedure.  Avoid applying ice directly to the skin.  If your face was treated, you may want to sleep with your head elevated on an extra pillow to help minimize swelling.    Wear sunscreen daily and avoid strenuous activity following rosacea treatments to optimize results.    Avoid extra aspirin, ibuprofen, vitamin E following the procedure - this may increase your chance of bruising.      Improper care of the treated area while the discoloration is present may increase the chance of scarring or skin textural changes to the treated area.    Please call the office if you have excessive discomfort, herpes simplex virus flare, or burns, blisters, or scabbing.      Brown spot laser:  Following the procedure, the treated area may be red and crusting may occur.  The affected areas should be treated delicately following treatment.  Discomfort may be treated with the application of cool compresses and the area should be cleaned gently daily with mild cleanser and water and covered with aqauphor daily until healed.  Improper care of the treated area while healing may increase the chance of scarring or skin textural changes to the treated area.    Avoid tanning or self-tanners while undergoing treatment - tan skin increases your chances or dyspigmentation and complications.    Call the office if you have fever, chills, or excess pain that is not relieved with Tylenol or inbuprofen, or any other concerns or questions following your treatment.      350

## 2025-02-12 NOTE — PROGRESS NOTES
tablet 0    zinc 50 MG CAPS Take by mouth      Ascorbic Acid (VITAMIN C) 500 MG tablet Take 2 tablets by mouth daily      MULTIPLE VITAMIN PO Take  by mouth.      Coenzyme Q-10 100 MG CAPS Take  by mouth.         Allergies   Allergen Reactions    Bactrim Ds [Sulfamethoxazole-Trimethoprim] Other (See Comments)     Very bad headache       Physical Examination     Cheeks with multiple tan macules and scattered telangiectasias    Assessment and Plan     Lentigines + telangiectasias  - IPL/GL + PDL for lentigines + telangiectasias  - 300-350/trx x 2           Laser Procedure Note       Jazmin Myers   YOB: 1945    DATE OF VISIT:  2025  Chief Complaint   Patient presents with    Laser Treatment     Face-cheeks         LASER: IPL, GL, Vbeam  DIAGNOSIS: Lentigines and telangiectasias, diffuse photodamage    We discussed treatment options, including no treatment as well as the following:  - need for multiple treatments and risk of incomplete clearance, recurrence  - risk of erythema, edema, purpura, dyspigmentation and scarring    PATIENT IDENTIFIED PER PROTOCOL: yes  LOCATION(S): face  VERIFIED AND MARKED: yes  TECHNIQUES, RISKS, BENEFITS AND ALTERNATIVES EXPLAINED: yes  CONSENT SIGNED, WITNESSED AND DATED: yes        OPERATIVE REPORT    DIAGNOSIS, LOCATION, PROCEDURE RECONFIRMED: yes   APPROPRIATE EYE PROTECTION for PATIENT: yes  APPROPRIATE EYE PROTECTION for PROVIDER and OTHERS PRESENT: yes  ANESTHESIA/PRE-OP MEDICATIONS: none  LASER SETTINGS:  (1)  WAVELENGTH: IPL - Lumenis - 12 J  (2)  WAVELENGTH: 755-GentleLASE LENS: 8 mm FLUENCE: 40 J   COOLING: Off  (3)  WAVELENGTH: 595 LENS: 3 x 10 FLUENCE: 12.5 PULSE DURATION: 10 COOLIN/20  (4)  WAVELENGTH: 595 LENS:  10 FLUENCE: 7.5 PULSE DURATION: 10 COOLIN/20    PROCEDURE NOTE:  Ultrasound gel applied to the face and treated with single pass with IPL with appropriate response.  Then focal lentigines treated with single pulses with setting

## 2025-04-24 ENCOUNTER — TELEPHONE (OUTPATIENT)
Age: 80
End: 2025-04-24

## 2025-04-24 NOTE — TELEPHONE ENCOUNTER
Pt called and said  about 3 days ago noticed a rash on both ankles. It has now moved up to  legs,trunk and neck area. Its's very red. She has been putting anti itch cream on it which helped some.        [phone]

## 2025-04-25 ENCOUNTER — OFFICE VISIT (OUTPATIENT)
Age: 80
End: 2025-04-25
Payer: MEDICARE

## 2025-04-25 DIAGNOSIS — L30.9 DERMATITIS: Primary | ICD-10-CM

## 2025-04-25 DIAGNOSIS — R23.3 PETECHIAE: ICD-10-CM

## 2025-04-25 PROCEDURE — 1090F PRES/ABSN URINE INCON ASSESS: CPT | Performed by: DERMATOLOGY

## 2025-04-25 PROCEDURE — 99212 OFFICE O/P EST SF 10 MIN: CPT | Performed by: DERMATOLOGY

## 2025-04-25 PROCEDURE — 1123F ACP DISCUSS/DSCN MKR DOCD: CPT | Performed by: DERMATOLOGY

## 2025-04-25 PROCEDURE — G8399 PT W/DXA RESULTS DOCUMENT: HCPCS | Performed by: DERMATOLOGY

## 2025-04-25 PROCEDURE — 99213 OFFICE O/P EST LOW 20 MIN: CPT | Performed by: DERMATOLOGY

## 2025-04-25 PROCEDURE — G8427 DOCREV CUR MEDS BY ELIG CLIN: HCPCS | Performed by: DERMATOLOGY

## 2025-04-25 PROCEDURE — G8421 BMI NOT CALCULATED: HCPCS | Performed by: DERMATOLOGY

## 2025-04-25 PROCEDURE — 1159F MED LIST DOCD IN RCRD: CPT | Performed by: DERMATOLOGY

## 2025-04-25 PROCEDURE — 1036F TOBACCO NON-USER: CPT | Performed by: DERMATOLOGY

## 2025-04-25 RX ORDER — TRIAMCINOLONE ACETONIDE 1 MG/G
CREAM TOPICAL
Qty: 80 G | Refills: 0 | Status: SHIPPED | OUTPATIENT
Start: 2025-04-25

## 2025-04-25 NOTE — PROGRESS NOTES
[facility]  Bernard Almendarez MD  [phone]      Jazmin Myers  1945    80 y.o. female     Date of Visit: 4/25/2025    Chief Complaint: rash    History of Present Illness:    She presents today for 3-day history of a progressively worsening mildly pruritic eruption on the chest, abdomen and lower extremities.  She was cleaning newly laid tile last week with a product.  She has not been exposed to any other new personal care products.    She walks 5 miles daily.   Was standing more recently.       Review of Systems:  Gen: Feels well, good sense of health.    Past Medical History, Family History, Surgical History, Medications and Allergies reviewed.    Past Medical History:   Diagnosis Date    CAD (coronary artery disease) 11/12    per CT cardiac qsavq=278    Carpal tunnel syndrome on left 09/2020    Colon polyp 2004    Compression fracture of L2 (HCC) 11/2017    DDD (degenerative disc disease), lumbar     L2-3; L3-4, L4-5, L5-S1    Hyperlipidemia     Hypothyroidism     Lateral meniscal tear 12/15    Osteoarthritis of right knee 12/15    Osteopenia     per DEXA     Past Surgical History:   Procedure Laterality Date    CARPAL TUNNEL RELEASE  11/2019    COLONOSCOPY      KNEE ARTHROSCOPY Right 3/31/2016    RIGHT KNEE ARTHROSCOPY WITH PARTIAL LATERAL MENISCECTOMY    THYROID SURGERY  3/13    right- biopsy       Allergies   Allergen Reactions    Bactrim Ds [Sulfamethoxazole-Trimethoprim] Other (See Comments)     Very bad headache     Outpatient Medications Marked as Taking for the 4/25/25 encounter (Office Visit) with Bernard Almendarez MD   Medication Sig Dispense Refill    triamcinolone (KENALOG) 0.1 % cream Apply to affected area twice daily for up to 2 weeks or until improved. 80 g 0           Physical Examination       Well appearing.    1.  Lower legs, abdomen and upper chest with several ill-defined scaly erythematous macules and patches.  Legs with multiple scattered petechia some of

## 2025-04-25 NOTE — TELEPHONE ENCOUNTER
Returned call to patient-Dr. Almendarez offered to see patient today regarding rash. Patient stated that after reading on the computer, she doesn't this she has cellulitis.     Patient does have an appointment today with her PCP and chose to see Dr. Almendarez.     Patient complained about not receiving a return call and I explained to patient that we were in clinic and did not have any openings to see her yesterday.